# Patient Record
Sex: FEMALE | Race: OTHER | Employment: UNEMPLOYED | URBAN - METROPOLITAN AREA
[De-identification: names, ages, dates, MRNs, and addresses within clinical notes are randomized per-mention and may not be internally consistent; named-entity substitution may affect disease eponyms.]

---

## 2017-02-14 ENCOUNTER — HOSPITAL ENCOUNTER (EMERGENCY)
Age: 57
Discharge: HOME | End: 2017-02-15
Attending: EMERGENCY MEDICINE
Payer: SUBSIDIZED

## 2017-02-14 DIAGNOSIS — J06.9 ACUTE UPPER RESPIRATORY INFECTION: ICD-10-CM

## 2017-02-14 DIAGNOSIS — R07.89 CHEST WALL PAIN: Primary | ICD-10-CM

## 2017-02-14 PROCEDURE — 99283 EMERGENCY DEPT VISIT LOW MDM: CPT | Performed by: EMERGENCY MEDICINE

## 2017-02-14 PROCEDURE — 87081 CULTURE SCREEN ONLY: CPT | Performed by: EMERGENCY MEDICINE

## 2017-02-14 PROCEDURE — 87880 STREP A ASSAY W/OPTIC: CPT | Performed by: EMERGENCY MEDICINE

## 2017-02-14 PROCEDURE — 87804 INFLUENZA ASSAY W/OPTIC: CPT | Performed by: EMERGENCY MEDICINE

## 2017-02-15 ENCOUNTER — APPOINTMENT (OUTPATIENT)
Dept: GENERAL RADIOLOGY | Age: 57
End: 2017-02-15
Attending: EMERGENCY MEDICINE
Payer: SUBSIDIZED

## 2017-02-15 VITALS
SYSTOLIC BLOOD PRESSURE: 150 MMHG | HEIGHT: 57 IN | RESPIRATION RATE: 18 BRPM | TEMPERATURE: 98.7 F | WEIGHT: 130 LBS | BODY MASS INDEX: 28.05 KG/M2 | DIASTOLIC BLOOD PRESSURE: 80 MMHG | OXYGEN SATURATION: 97 % | HEART RATE: 74 BPM

## 2017-02-15 LAB
DEPRECATED S PYO AG THROAT QL EIA: NEGATIVE
FLUAV AG NPH QL IA: NEGATIVE
FLUBV AG NPH QL IA: NEGATIVE

## 2017-02-15 PROCEDURE — 71020 XR CHEST PA LAT: CPT

## 2017-02-15 NOTE — ED NOTES
Pt discharged instructions given thru . Pt v/u of  Instructions. Pt ambulatory on discharge in no acute distress at discharge.

## 2017-02-15 NOTE — DISCHARGE INSTRUCTIONS
Infección de las vías respiratorias altas ([place]): Instrucciones de cuidado - [ Upper Respiratory Infection (Cold): Care Instructions ]  Instrucciones de cuidado    La infección de las vías respiratorias altas (o URI, por jonathan siglas en inglés), es sebastián infección de la [place], los senos paranasales o la garganta. Las URI se transmiten por la tos, los estornudos y el contacto directo. El resfriado común es el tipo más frecuente de URI. La gripe y las infecciones de los senos paranasales son otros tipos de URI. Dia todas las URI son causadas por virus. Los antibióticos no las Marthenia Croon. Sin embargo, usted puede tratar la mayoría de estas infecciones con cuidados en el hogar. Arjay puede implicar beber muchos líquidos y kumar analgésicos (medicamentos para el dolor) de venta julio. Es probable que se sienta mejor al cabo de 4 a 10 días. El médico lo gore revisado minuciosamente, lilly se pueden presentar problemas más tarde. Si nota algún problema o nuevos síntomas, busque tratamiento médico inmediatamente. La atención de seguimiento es sebastián parte clave de champion tratamiento y seguridad. Asegúrese de hacer y acudir a todas las citas, y llame a champion médico si está teniendo problemas. También es sebastián buena idea saber los resultados de jonathan exámenes y mantener sebastián lista de los medicamentos que wm. ¿Cómo puede cuidarse en el hogar? · Para prevenir la deshidratación, irma abundantes líquidos, los suficientes renate para que champion orina sea de color amarillo little o transparente renate el agua. Opte por beber agua y otros líquidos mary sin cafeína hasta que se sienta mejor. Si tiene [facility], del corazón o del hígado y tiene que [place]'s líquidos, hable con champion médico antes de aumentar champion consumo. · Sula un analgésico de venta julio, renate acetaminofén (Tylenol), ibuprofeno (Advil, Motrin) o naproxeno (Aleve). Sera y siga todas las instrucciones de la [place].   · Antes de usar medicamentos para la tos y los resfriados, revise la etiqueta. Estos medicamentos podrían no ser seguros para los niños pequeños o las personas con ciertos problemas de [place]. · Tenga cuidado cuando tome medicamentos de venta julio para el resfriado común o la gripe y Tylenol al [employer]. Muchos de estos medicamentos contienen acetaminofén, o sea, Tylenol. Sera las etiquetas para asegurarse de que no está tomando sebastián dosis mayor que la recomendada. El exceso de acetaminofén (Tylenol) puede ser dañino. · Descanse lo suficiente. · No fume ni permita que otros fumen cerca de usted. Si necesita ayuda para dejar de fumar, hable con champion médico acerca de programas y medicamentos para dejar de fumar. Estos pueden aumentar jonathan probabilidades de dejar el hábito para siempre. ¿Cuándo debe pedir ayuda? Llame al 911 en cualquier momento que considere que necesita atención de [place]. Por ejemplo, llame si:  · Tiene graves dificultades para respirar. Llame a champion médico ahora mismo o busque atención médica inmediata si:  · Le parece que está mucho más enfermo. · Tiene nueva o peor dificultad para respirar. · Tiene fiebre nueva o más conrad. · Tiene un salpullido nuevo. Preste especial atención a los cambios en champion naresh y asegúrese de comunicarse con champion médico si:  · Tiene síntomas nuevos, renate dolor de garganta, dolor de oídos o dolor de los senos paranasales. · Champion tos es más profunda o más frecuente que antes, especialmente si nota más mucosidad o un cambio en el color de la mucosidad. · No mejora renate se esperaba. ¿Dónde puede encontrar más información en inglés? Yue Presto a http://elías-sara.info/. Staralex Ramin T806 en la búsqueda para aprender más acerca de \"Infección de las vías respiratorias altas ([place]): Instrucciones de cuidado - [ Upper Respiratory Infection (Cold): Care Instructions ]. \"  Revisado: 24 krause, 2016  Versión del contenido: 11.1  © 0172-5650 [employer].  Las instrucciones de cuidado fueron adaptadas bajo licencia por [employer] (which disclaims liability or warranty for this information). Si usted tiene [place] afección médica o sobre estas instrucciones, siempre pregunte a champion profesional de naresh. [facility]orporated niega toda garantía o responsabilidad por champion uso de esta información. Dolor de pecho musculoesquelético: Instrucciones de cuidado - [ Musculoskeletal Chest Pain: Care Instructions ]  Instrucciones de cuidado  El dolor de pecho no siempre es sebastián señal de que haya algo rosa maria con champion corazón, o de que tenga algún otro problema grave de naresh. Champion médico piensa que champion dolor de pecho es causado por músculos o ligamentos forzados, inflamación del cartílago del pecho, o algún otro problema en el pecho y no en el corazón. Es posible que necesite más pruebas para determinar la causa del dolor de [place]. La atención de seguimiento es sebastián parte clave de champion tratamiento y seguridad. Asegúrese de hacer y acudir a todas las citas, y llame a champion médico si está teniendo problemas. También es sebastián buena idea saber los resultados de los exámenes y mantener sebastián lista de los medicamentos que wm. ¿Cómo puede cuidarse en el hogar? · [employer] analgésicos (medicamentos para el dolor) exactamente renate le fueron indicados. ¨ Si el médico le recetó un analgésico, tómelo según las indicaciones. ¨ Si no está tomando un analgésico recetado, pregúntele a champion médico si puede kumar imelda de [employer]. · Descanse y proteja la jose angel adolorida. · Interrumpa, modifique o suspenda cualquier actividad que pudiera estar causándole el dolor. · Colóquese hielo o sebastián compresa fría en la jose angel adolorida axel 10 a 20 minutos cada vez. Trate de hacerlo cada 1 a 2 horas axel los siguientes 3 días (cuando esté despierto) o hasta que la hinchazón baje. Póngase un paño gibson entre el hielo y la piel.   · Después de 2 ó 3 días, aplíquese sebastián toalla tibia o sebastián almohadilla térmica a baja temperatura en la jose angel adolorida. Algunos médicos sugieren que se alterne entre tratamientos con calor y frío. · No se envuelva ni se vende con cinta las costillas para sostenerlas. [place] podría hacer que usted harman respiraciones más cortas, lo que podría aumentar champion riesgo de [employer]. · Las cremas [place], renate [place] o [place], podrían aliviar los músculos adoloridos. Siga las instrucciones del envase. · Siga las instrucciones de champion médico sobre el ejercicio. · El estiramiento y el masaje suaves podrían ayudarle a mejorarse más rápidamente. Estírese despacio hasta el punto antes de que comience el dolor y mantenga el estiramiento axel al menos 15 a 30 segundos. Harman esto 3 ó 4 veces al día. Harman estiramientos después de haberse aplicado calor. · A medida que champion dolor mejore, vuelva poco a poco a jonathan actividades normales. Si el dolor [place], podría ser sebastián señal de que necesita descansar axel [place]. ¿Cuándo debe pedir ayuda? Llame al 911 en cualquier momento que considere que necesita atención de emergencia. Por ejemplo, llame si:  · Siente dolor u opresión en el pecho. Estos síntomas podrían estar acompañados de:  ¨ Sudoración. ¨ Falta de aire. ¨ Náuseas o vómito. ¨ Dolor que se extiende del pecho al francois, la [place], o hacia imelda o ambos hombros o [id number]. ¨ Mareo o aturdimiento. ¨ Pulso rápido o irregular. Después de llamar al 911, mastique 1 aspirina para adultos. Espere sebastián ambulancia. No trate de conducir usted mismo un automóvil. · Tiene dolor repentino en el pecho y falta de [place], o tose willa. Llame a champion médico ahora mismo o busque atención médica inmediata si:  · Tiene cualquier dificultad para respirar. · El dolor en el pecho empeora. · Champion dolor de pecho aparece constantemente con el ejercicio y se shiv con el reposo.   Preste especial atención a los cambios en champion naresh y asegúrese de comunicarse con champion médico si:  · Champion dolor de pecho no mejora después de 1 semana. ¿Dónde puede encontrar más información en inglés? Karley Roca a http://elías-sara.info/. Diego Goon N599 en la búsqueda para aprender más acerca de \"Dolor de pecho musculoesquelético: Instrucciones de cuidado - [ Musculoskeletal Chest Pain: Care Instructions ]. \"  Revisado: 27 krause, 2016  Versión del contenido: 11.1  © 7013-2112 Healthwise, Incorporated. Las instrucciones de cuidado fueron adaptadas bajo licencia por [employer] (which disclaims liability or warranty for this information). Si usted tiene [place] afección médica o sobre estas instrucciones, siempre pregunte a champion profesional de naresh. [employer]wise, Incorporated niega toda garantía o responsabilidad por champion uso de esta información. Dolor de pecho: Instrucciones de cuidado - [ Chest Pain: Care Instructions ]  Instrucciones de cuidado  El dolor de pecho puede tener muchas causas. Algunas no son graves y mejorarán por sí solas en pocos días. Semaj algunos tipos de dolor de pecho requieren más pruebas y [place]. Es posible que champion médico le haya recomendado sebastián visita de seguimiento dentro de las 8 a 12 horas siguientes. Si no mejora, es posible que necesite [address] pruebas o [place]. Aunque champion médico le haya dado de conrad, es necesario que esté atento a cualquier problema que se presente. El médico le hizo un cuidadoso chequeo, semaj a veces los problemas pueden aparecer posteriormente. Si tiene nuevos síntomas o éstos no mejoran, obtenga atención médica de inmediato. Si tiene dolor o presión en el pecho que empeora o es diferente y que dura más de 5 minutos, o se desmayó (perdió el conocimiento), llame al 911 o busque otra ayuda de emergencia de inmediato. Acudir a sebastián consulta médica es sólo un paso en champion tratamiento.  Aunque se sienta mejor, todavía deberá hacer lo que champion médico le recomiende, renate asistir a todas las visitas de seguimiento sugeridas y kumar los medicamentos exactamente renate le fueron indicados. [place] le ayudará a recuperarse y prevenir problemas futuros. ¿Cómo puede cuidarse en el hogar? · Descanse hasta que se sienta mejor. · [place] jonathan medicamentos exactamente renate le fueron recetados. Llame a champion médico si mirella estar teniendo problemas con champion medicamento. · No conduzca después de kumar un analgésico (medicamento para el dolor) recetado. ¿Cuándo debe pedir ayuda? Llame al 911 si:  · Se desmayó (perdió el conocimiento). · Tiene graves dificultades para respirar. · Tiene síntomas de un ataque al corazón. Estos podrían incluir:  ¨ Dolor o presión en el pecho, o sebastián sensación extraña en el pecho. ¨ Sudoración. ¨ Falta de aire. ¨ Náuseas o vómito. ¨ Dolor, presión o sebastián sensación extraña en la espalda, el francois, la mandíbula, la parte superior del abdomen o en imelda o ambos hombros o brazos. ¨ Aturdimiento o debilidad repentina. ¨ Latidos del corazón rápidos o irregulares. Después de llamar al 911, es posible que el operador le diga que mastique 1 aspirina para adultos o de 2 a 4 aspirinas de dosis baja. Espere a sebastián ambulancia. No intente conducir usted mismo. Llame hoy a champion médico si:  · Tiene cualquier dificultad para respirar. · El dolor en el pecho empeora. · Siente mareos o aturdimiento, o que está a punto de [place]. · No mejora renate se esperaba. · Tiene dolor de pecho nuevo o diferente. ¿Dónde puede encontrar más información en inglés? Alyssa Tapia a http://kofi.info/. Escriba A120 en la búsqueda para aprender más acerca de \"Dolor de pecho: Instrucciones de cuidado - [ Chest Pain: Care Instructions ]. \"  Revisado: 27 krause, 2016  Versión del contenido: 11.1  © 9608-4038 [employer]. Las instrucciones de cuidado fueron adaptadas bajo licencia por [employer] (which disclaims liability or warranty for this information).  Si usted tiene [place] afección médica o sobre estas instrucciones, siempre pregunte a champion profesional de naresh. [facility]orporated niega toda garantía o responsabilidad por champion uso de esta información.

## 2017-02-15 NOTE — ED PROVIDER NOTES
HPI Comments: History is provided with assistance of a . Patient has not been sick for probably at least 4 or 5 days with some slight sore throat and body aches and some soreness to mainly her left chest that is worse with cough. Cough is throughout this time is been nonproductive. She has no GI or  symptoms. No anginal type symptoms. Patient is a 64 y.o. female presenting with sore throat. The history is provided by the patient. A  was used. Sore Throat    This is a new problem. There has been no fever. Pertinent negatives include no diarrhea, no vomiting, no shortness of breath and no stiff neck. She has tried nothing for the symptoms. Past Medical History:   Diagnosis Date    Other ill-defined conditions(139.12)      broken rib, hernia       Past Surgical History:   Procedure Laterality Date    Hx orthopaedic       right hand          History reviewed. No pertinent family history. Social History     Social History    Marital status: SINGLE     Spouse name: N/A    Number of children: N/A    Years of education: N/A     Occupational History    Not on file. Social History Main Topics    Smoking status: Never Smoker    Smokeless tobacco: Not on file    Alcohol use No    Drug use: No    Sexual activity: Not on file     Other Topics Concern    Not on file     Social History Narrative    ** Merged History Encounter **              ALLERGIES: Review of patient's allergies indicates no known allergies. Review of Systems   Constitutional: Negative for chills and fever. HENT: Positive for sore throat. Respiratory: Negative. Negative for shortness of breath. Gastrointestinal: Negative for diarrhea and vomiting. Genitourinary: Negative. All other systems reviewed and are negative.       Vitals:    02/14/17 2330   BP: 153/83   Pulse: 76   Resp: 18   Temp: 98.7 °F (37.1 °C)   SpO2: 97%   Weight: 59 kg (130 lb)   Height: 4' 9\" (1.448 m) Physical Exam   Constitutional: She appears well-developed and well-nourished. No distress. HENT:   Head: Atraumatic. Mouth/Throat: No oropharyngeal exudate. Slight whitish exudate   Eyes: Conjunctivae are normal. No scleral icterus. Neck: Neck supple. Cardiovascular: Normal rate and intact distal pulses. Pulmonary/Chest: Effort normal. No respiratory distress. She has no wheezes. She exhibits tenderness. Abdominal: Soft. There is no tenderness. There is no rebound. Neurological: She is alert. Skin: Skin is warm and dry. No rash noted. No erythema. Psychiatric: Her behavior is normal. Thought content normal.   Nursing note and vitals reviewed. MDM  Number of Diagnoses or Management Options  Acute upper respiratory infection:   Chest wall pain:   Diagnosis management comments: Viral-like illness. Will treat sx. No evidence of strep or abscess. Not toxic or septic       Amount and/or Complexity of Data Reviewed  Clinical lab tests: reviewed    Risk of Complications, Morbidity, and/or Mortality  Presenting problems: moderate  Diagnostic procedures: low  Management options: low    Patient Progress  Patient progress: stable    ED Course       Procedures    Recent Results (from the past 12 hour(s))   STREP AG SCREEN, GROUP A    Collection Time: 02/14/17 11:33 PM   Result Value Ref Range    Group A Strep Ag ID NEGATIVE  NEG     INFLUENZA A & B AG (RAPID TEST)    Collection Time: 02/14/17 11:36 PM   Result Value Ref Range    Influenza A Ag NEGATIVE  NEG      Influenza B Ag NEGATIVE  NEG        EXAM: XR CHEST PA LAT     INDICATION: cough and left chest pain with cough     COMPARISON: None.     FINDINGS: PA and lateral radiographs of the chest demonstrate clear lungs.  The  cardiac and mediastinal contours and pulmonary vascularity are normal. The  bones and soft tissues are within normal limits.      IMPRESSION  IMPRESSION: Normal chest.

## 2017-02-17 LAB
BACTERIA SPEC CULT: NORMAL
SERVICE CMNT-IMP: NORMAL

## 2017-04-05 ENCOUNTER — HOSPITAL ENCOUNTER (OUTPATIENT)
Dept: MAMMOGRAPHY | Age: 57
Discharge: HOME | End: 2017-04-05
Attending: OBSTETRICS & GYNECOLOGY
Payer: COMMERCIAL

## 2017-04-05 DIAGNOSIS — Z12.31 VISIT FOR SCREENING MAMMOGRAM: ICD-10-CM

## 2017-04-05 PROCEDURE — 77067 SCR MAMMO BI INCL CAD: CPT

## 2018-03-12 ENCOUNTER — APPOINTMENT (OUTPATIENT)
Dept: GENERAL RADIOLOGY | Age: 58
End: 2018-03-12
Attending: EMERGENCY MEDICINE
Payer: SELF-PAY

## 2018-03-12 ENCOUNTER — HOSPITAL ENCOUNTER (EMERGENCY)
Age: 58
Discharge: HOME | End: 2018-03-12
Attending: EMERGENCY MEDICINE
Payer: SELF-PAY

## 2018-03-12 VITALS
SYSTOLIC BLOOD PRESSURE: 132 MMHG | TEMPERATURE: 98.7 F | BODY MASS INDEX: 26.5 KG/M2 | WEIGHT: 135 LBS | RESPIRATION RATE: 20 BRPM | HEART RATE: 84 BPM | HEIGHT: 60 IN | DIASTOLIC BLOOD PRESSURE: 87 MMHG | OXYGEN SATURATION: 98 %

## 2018-03-12 DIAGNOSIS — R05.9 COUGH: Primary | ICD-10-CM

## 2018-03-12 LAB
ALBUMIN SERPL-MCNC: 3.6 G/DL (ref 3.5–5)
ALBUMIN/GLOB SERPL: 0.9 {RATIO} (ref 1.2–3.5)
ALP SERPL-CCNC: 112 U/L (ref 50–136)
ALT SERPL-CCNC: 59 U/L (ref 12–65)
ANION GAP SERPL CALC-SCNC: 11 MMOL/L (ref 7–16)
AST SERPL-CCNC: 32 U/L (ref 15–37)
ATRIAL RATE: 61 BPM
BACTERIA URNS QL MICRO: 0 /HPF
BASOPHILS # BLD: 0 K/UL (ref 0–0.2)
BASOPHILS NFR BLD: 1 % (ref 0–2)
BILIRUB SERPL-MCNC: 0.3 MG/DL (ref 0.2–1.1)
BNP SERPL-MCNC: 4 PG/ML
BUN SERPL-MCNC: 16 MG/DL (ref 6–23)
CALCIUM SERPL-MCNC: 8.6 MG/DL (ref 8.3–10.4)
CALCULATED P AXIS, ECG09: 25 DEGREES
CALCULATED R AXIS, ECG10: 82 DEGREES
CALCULATED T AXIS, ECG11: 34 DEGREES
CASTS URNS QL MICRO: 0 /LPF
CHLORIDE SERPL-SCNC: 108 MMOL/L (ref 98–107)
CO2 SERPL-SCNC: 25 MMOL/L (ref 21–32)
CREAT SERPL-MCNC: 0.58 MG/DL (ref 0.6–1)
DEPRECATED S PYO AG THROAT QL EIA: NEGATIVE
DIAGNOSIS, 93000: NORMAL
DIFFERENTIAL METHOD BLD: NORMAL
EOSINOPHIL # BLD: 0.1 K/UL (ref 0–0.8)
EOSINOPHIL NFR BLD: 1 % (ref 0.5–7.8)
EPI CELLS #/AREA URNS HPF: NORMAL /HPF
ERYTHROCYTE [DISTWIDTH] IN BLOOD BY AUTOMATED COUNT: 13.2 % (ref 11.9–14.6)
FLUAV AG NPH QL IA: NEGATIVE
FLUBV AG NPH QL IA: NEGATIVE
GLOBULIN SER CALC-MCNC: 4 G/DL (ref 2.3–3.5)
GLUCOSE SERPL-MCNC: 108 MG/DL (ref 65–100)
HCT VFR BLD AUTO: 41.1 % (ref 35.8–46.3)
HGB BLD-MCNC: 14.3 G/DL (ref 11.7–15.4)
IMM GRANULOCYTES # BLD: 0 K/UL (ref 0–0.5)
IMM GRANULOCYTES NFR BLD AUTO: 0 % (ref 0–5)
LYMPHOCYTES # BLD: 1.7 K/UL (ref 0.5–4.6)
LYMPHOCYTES NFR BLD: 40 % (ref 13–44)
MCH RBC QN AUTO: 31.3 PG (ref 26.1–32.9)
MCHC RBC AUTO-ENTMCNC: 34.8 G/DL (ref 31.4–35)
MCV RBC AUTO: 89.9 FL (ref 79.6–97.8)
MONOCYTES # BLD: 0.4 K/UL (ref 0.1–1.3)
MONOCYTES NFR BLD: 10 % (ref 4–12)
NEUTS SEG # BLD: 2.1 K/UL (ref 1.7–8.2)
NEUTS SEG NFR BLD: 48 % (ref 43–78)
P-R INTERVAL, ECG05: 154 MS
PLATELET # BLD AUTO: 213 K/UL (ref 150–450)
PMV BLD AUTO: 11.3 FL (ref 10.8–14.1)
POTASSIUM SERPL-SCNC: 3.8 MMOL/L (ref 3.5–5.1)
PROT SERPL-MCNC: 7.6 G/DL (ref 6.3–8.2)
Q-T INTERVAL, ECG07: 462 MS
QRS DURATION, ECG06: 82 MS
QTC CALCULATION (BEZET), ECG08: 465 MS
RBC # BLD AUTO: 4.57 M/UL (ref 4.05–5.25)
RBC #/AREA URNS HPF: 0 /HPF
SODIUM SERPL-SCNC: 144 MMOL/L (ref 136–145)
TROPONIN I BLD-MCNC: 0 NG/ML (ref 0.02–0.05)
VENTRICULAR RATE, ECG03: 61 BPM
WBC # BLD AUTO: 4.4 K/UL (ref 4.3–11.1)
WBC URNS QL MICRO: NORMAL /HPF

## 2018-03-12 PROCEDURE — 87804 INFLUENZA ASSAY W/OPTIC: CPT | Performed by: EMERGENCY MEDICINE

## 2018-03-12 PROCEDURE — 84484 ASSAY OF TROPONIN QUANT: CPT

## 2018-03-12 PROCEDURE — 85025 COMPLETE CBC W/AUTO DIFF WBC: CPT | Performed by: EMERGENCY MEDICINE

## 2018-03-12 PROCEDURE — 81015 MICROSCOPIC EXAM OF URINE: CPT | Performed by: EMERGENCY MEDICINE

## 2018-03-12 PROCEDURE — 83880 ASSAY OF NATRIURETIC PEPTIDE: CPT | Performed by: EMERGENCY MEDICINE

## 2018-03-12 PROCEDURE — 96374 THER/PROPH/DIAG INJ IV PUSH: CPT | Performed by: EMERGENCY MEDICINE

## 2018-03-12 PROCEDURE — 94640 AIRWAY INHALATION TREATMENT: CPT

## 2018-03-12 PROCEDURE — 74011250636 HC RX REV CODE- 250/636: Performed by: EMERGENCY MEDICINE

## 2018-03-12 PROCEDURE — 96361 HYDRATE IV INFUSION ADD-ON: CPT | Performed by: EMERGENCY MEDICINE

## 2018-03-12 PROCEDURE — 77030013140 HC MSK NEB VYRM -A

## 2018-03-12 PROCEDURE — 87081 CULTURE SCREEN ONLY: CPT | Performed by: EMERGENCY MEDICINE

## 2018-03-12 PROCEDURE — 87880 STREP A ASSAY W/OPTIC: CPT | Performed by: EMERGENCY MEDICINE

## 2018-03-12 PROCEDURE — 74011000250 HC RX REV CODE- 250: Performed by: EMERGENCY MEDICINE

## 2018-03-12 PROCEDURE — 93005 ELECTROCARDIOGRAM TRACING: CPT | Performed by: EMERGENCY MEDICINE

## 2018-03-12 PROCEDURE — 71046 X-RAY EXAM CHEST 2 VIEWS: CPT

## 2018-03-12 PROCEDURE — 80053 COMPREHEN METABOLIC PANEL: CPT | Performed by: EMERGENCY MEDICINE

## 2018-03-12 PROCEDURE — 99284 EMERGENCY DEPT VISIT MOD MDM: CPT | Performed by: EMERGENCY MEDICINE

## 2018-03-12 RX ORDER — DOXYCYCLINE HYCLATE 100 MG
100 TABLET ORAL 2 TIMES DAILY
Qty: 20 TAB | Refills: 0 | Status: SHIPPED | OUTPATIENT
Start: 2018-03-12 | End: 2018-03-22

## 2018-03-12 RX ORDER — SODIUM CHLORIDE 0.9 % (FLUSH) 0.9 %
5-10 SYRINGE (ML) INJECTION EVERY 8 HOURS
Status: DISCONTINUED | OUTPATIENT
Start: 2018-03-12 | End: 2018-03-12 | Stop reason: HOSPADM

## 2018-03-12 RX ORDER — SODIUM CHLORIDE 0.9 % (FLUSH) 0.9 %
5-10 SYRINGE (ML) INJECTION AS NEEDED
Status: DISCONTINUED | OUTPATIENT
Start: 2018-03-12 | End: 2018-03-12 | Stop reason: HOSPADM

## 2018-03-12 RX ORDER — ALBUTEROL SULFATE 0.83 MG/ML
5 SOLUTION RESPIRATORY (INHALATION)
Status: COMPLETED | OUTPATIENT
Start: 2018-03-12 | End: 2018-03-12

## 2018-03-12 RX ADMIN — SODIUM CHLORIDE 1000 ML: 900 INJECTION, SOLUTION INTRAVENOUS at 13:21

## 2018-03-12 RX ADMIN — METHYLPREDNISOLONE SODIUM SUCCINATE 125 MG: 125 INJECTION, POWDER, FOR SOLUTION INTRAMUSCULAR; INTRAVENOUS at 16:22

## 2018-03-12 RX ADMIN — Medication 10 ML: at 15:33

## 2018-03-12 RX ADMIN — ALBUTEROL SULFATE 5 MG: 2.5 SOLUTION RESPIRATORY (INHALATION) at 16:57

## 2018-03-12 NOTE — ED NOTES
I have reviewed discharge instructions with the patient. The patient verbalized understanding. Patient to follow up with PMD and RTED with any changes/concerns. Patient expresses understanding. Patient ambulatory from ED in NAD with Rx x 1.  line utilized.

## 2018-03-12 NOTE — ED NOTES
Patient up for Discharge. Awaiting  back in department for assistance. If  takes any longer than 15 minutes, will utilize language line.

## 2018-03-12 NOTE — ED PROVIDER NOTES
HPI Comments: 1:18 PM Patient was seen in triage, a brief history and physical was done. Labs and/or other studies were ordered pending placement of patient in the back. vvia , patient states she has had several days of a dry cough and \"my throat feeling dry\". She denies any orthopnea, no vomiting or diarrhea or fever. Aretha iFore MD    Patient is a 63 yo female with no significant PMH (Irish speaking,  used)      Note as above, care assumed by myself, will add that she has had productive cough which she describes as \"brown and green\" and states she feels flem/drainage in her throat in the morning and late at night and it feels dry through-out the day. Denies fevers or chills, no abdominal pain. States chest pain with coughing, none currently. The history is provided by the patient. No  was used. Past Medical History:   Diagnosis Date    Other ill-defined conditions     broken rib, hernia       Past Surgical History:   Procedure Laterality Date    HX ORTHOPAEDIC      right hand          No family history on file. Social History     Social History    Marital status: SINGLE     Spouse name: N/A    Number of children: N/A    Years of education: N/A     Occupational History    Not on file. Social History Main Topics    Smoking status: Never Smoker    Smokeless tobacco: Not on file    Alcohol use No    Drug use: No    Sexual activity: Not on file     Other Topics Concern    Not on file     Social History Narrative    ** Merged History Encounter **         ** Merged History Encounter **              ALLERGIES: Review of patient's allergies indicates no known allergies. Review of Systems   Constitutional: Negative for chills and fever. HENT: Positive for congestion, postnasal drip and sore throat. Eyes: Negative for visual disturbance. Respiratory: Positive for cough. Negative for shortness of breath.     Cardiovascular: Negative for chest pain and leg swelling. Gastrointestinal: Negative for abdominal pain, diarrhea, nausea and vomiting. Genitourinary: Negative for dysuria. Musculoskeletal: Negative for back pain and neck pain. Skin: Negative for rash. Neurological: Negative for weakness and headaches. Psychiatric/Behavioral: The patient is not nervous/anxious. There were no vitals filed for this visit. Physical Exam   Constitutional: She is oriented to person, place, and time. She appears well-developed and well-nourished. HENT:   Head: Normocephalic. Right Ear: External ear normal.   Left Ear: External ear normal.   Eyes: Conjunctivae and EOM are normal. Pupils are equal, round, and reactive to light. Neck: Normal range of motion. Neck supple. No tracheal deviation present. Cardiovascular: Normal rate, regular rhythm, normal heart sounds and intact distal pulses. No murmur heard. Pulmonary/Chest: Effort normal and breath sounds normal. No respiratory distress. Abdominal: Soft. There is no tenderness. Musculoskeletal: Normal range of motion. Neurological: She is alert and oriented to person, place, and time. No cranial nerve deficit. Skin: No rash noted. Nursing note and vitals reviewed.        MDM  Number of Diagnoses or Management Options  Cough: new and requires workup     Amount and/or Complexity of Data Reviewed  Clinical lab tests: ordered and reviewed  Tests in the radiology section of CPT®: ordered and reviewed  Tests in the medicine section of CPT®: ordered and reviewed  Review and summarize past medical records: yes    Risk of Complications, Morbidity, and/or Mortality  Presenting problems: high  Diagnostic procedures: high  Management options: high    Patient Progress  Patient progress: stable        ED Course       Procedures    Recent Results (from the past 12 hour(s))   INFLUENZA A & B AG (RAPID TEST)    Collection Time: 03/12/18  1:23 PM   Result Value Ref Range    Influenza A Ag NEGATIVE  NEG      Influenza B Ag NEGATIVE  NEG     STREP AG SCREEN, GROUP A    Collection Time: 03/12/18  1:23 PM   Result Value Ref Range    Group A Strep Ag ID NEGATIVE  NEG     CBC WITH AUTOMATED DIFF    Collection Time: 03/12/18  1:30 PM   Result Value Ref Range    WBC 4.4 4.3 - 11.1 K/uL    RBC 4.57 4.05 - 5.25 M/uL    HGB 14.3 11.7 - 15.4 g/dL    HCT 41.1 35.8 - 46.3 %    MCV 89.9 79.6 - 97.8 FL    MCH 31.3 26.1 - 32.9 PG    MCHC 34.8 31.4 - 35.0 g/dL    RDW 13.2 11.9 - 14.6 %    PLATELET 687 467 - 360 K/uL    MPV 11.3 10.8 - 14.1 FL    DF AUTOMATED      NEUTROPHILS 48 43 - 78 %    LYMPHOCYTES 40 13 - 44 %    MONOCYTES 10 4.0 - 12.0 %    EOSINOPHILS 1 0.5 - 7.8 %    BASOPHILS 1 0.0 - 2.0 %    IMMATURE GRANULOCYTES 0 0.0 - 5.0 %    ABS. NEUTROPHILS 2.1 1.7 - 8.2 K/UL    ABS. LYMPHOCYTES 1.7 0.5 - 4.6 K/UL    ABS. MONOCYTES 0.4 0.1 - 1.3 K/UL    ABS. EOSINOPHILS 0.1 0.0 - 0.8 K/UL    ABS. BASOPHILS 0.0 0.0 - 0.2 K/UL    ABS. IMM. GRANS. 0.0 0.0 - 0.5 K/UL   METABOLIC PANEL, COMPREHENSIVE    Collection Time: 03/12/18  1:30 PM   Result Value Ref Range    Sodium 144 136 - 145 mmol/L    Potassium 3.8 3.5 - 5.1 mmol/L    Chloride 108 (H) 98 - 107 mmol/L    CO2 25 21 - 32 mmol/L    Anion gap 11 7 - 16 mmol/L    Glucose 108 (H) 65 - 100 mg/dL    BUN 16 6 - 23 MG/DL    Creatinine 0.58 (L) 0.6 - 1.0 MG/DL    GFR est AA >60 >60 ml/min/1.73m2    GFR est non-AA >60 >60 ml/min/1.73m2    Calcium 8.6 8.3 - 10.4 MG/DL    Bilirubin, total 0.3 0.2 - 1.1 MG/DL    ALT (SGPT) 59 12 - 65 U/L    AST (SGOT) 32 15 - 37 U/L    Alk.  phosphatase 112 50 - 136 U/L    Protein, total 7.6 6.3 - 8.2 g/dL    Albumin 3.6 3.5 - 5.0 g/dL    Globulin 4.0 (H) 2.3 - 3.5 g/dL    A-G Ratio 0.9 (L) 1.2 - 3.5     BNP    Collection Time: 03/12/18  1:30 PM   Result Value Ref Range    BNP 4 pg/mL   EKG, 12 LEAD, INITIAL    Collection Time: 03/12/18  3:53 PM   Result Value Ref Range    Ventricular Rate 61 BPM    Atrial Rate 61 BPM    P-R Interval 154 ms QRS Duration 82 ms    Q-T Interval 462 ms    QTC Calculation (Bezet) 465 ms    Calculated P Axis 25 degrees    Calculated R Axis 82 degrees    Calculated T Axis 34 degrees    Diagnosis       Sinus rhythm with Fusion complexes  Otherwise normal ECG  No previous ECGs available     POC TROPONIN-I    Collection Time: 03/12/18  4:19 PM   Result Value Ref Range    Troponin-I (POC) 0 (L) 0.02 - 0.05 ng/ml   URINE MICROSCOPIC    Collection Time: 03/12/18  4:36 PM   Result Value Ref Range    WBC 0-3 0 /hpf    RBC 0 0 /hpf    Epithelial cells 0-3 0 /hpf    Bacteria 0 0 /hpf    Casts 0 0 /lpf     Xr Chest Pa Lat    Result Date: 3/12/2018  TWO-VIEW CHEST: CLINICAL HISTORY: Cough for 2 weeks. COMPARISON:  February 15, 2017. FINDINGS: PA and lateral chest images demonstrate no acute pneumonic infiltrate or significant pleural fluid collection. The heart size is within normal limits without evidence of congestive heart failure or pneumothorax. The bony thorax appears intact on these views. IMPRESSION:  NO ACUTE CARDIOPULMONARY DISEASE IDENTIFIED. 61 yo female with cough and congestion:    Given duration will treat with doxy for bronchitis vs. Subclinical pneumonia. Patient to follow up with PCP in 2-3 days for recheck or return immediately with any worsening cough, chest pain, shortness of breath or any further concerns. Of note, patient improved with rx with albuterol in ED.

## 2018-03-12 NOTE — DISCHARGE INSTRUCTIONS
Tos: Instrucciones de cuidado - [ Cough: Care Instructions ]  Instrucciones de cuidado    La tos es Vel[place] de champion cuerpo a algo que molesta en la garganta o las vías respiratorias. La tos puede ser provocada por muchas cosas. Usted podría toser debido a un resfriado o sebastián gripe, sebastián bronquitis o el asma. Fumar, el goteo posnasal, las alergias y el ácido estomacal que regresa a champion garganta también pueden causar tos. La tos es un síntoma, no sebastián enfermedad. En la [employer], la tos cesa cuando desaparece la causa, renate un resfriado. Puede kumar algunas medidas en champion hogar para toser menos y sentirse mejor. La atención de seguimiento es sebastián parte clave de champion tratamiento y seguridad. Asegúrese de hacer y acudir a todas las citas, y llame a champion médico si está teniendo problemas. También es sebastián buena idea saber los resultados de los exámenes y mantener sebastián lista de los medicamentos que wm. ¿Cómo puede cuidarse en el hogar? · Deanne abundante agua y otros líquidos. Chacra ayuda a [place] [employer] mucosidad sea menos espesa y [place] la garganta seca o adolorida. La miel o el jugo de danish en [place] o té podrían aliviar sebastián tos seca. · [employer] International medicamentos para la tos según las indicaciones de champion médico.  · Eleve la sheldon sobre almohadas para ayudarle a respirar y aliviar la tos seca. · Pruebe pastillas para la tos para aliviar la garganta seca o adolorida. Las pastillas para la tos no detienen la tos. Las pastillas para la tos medicinales saborizadas no son mejores que las pastillas con sabor a dee o los caramelos duros. · No fume. Evite el humo de tabaco ambiental. Si necesita ayuda para dejar de fumar, hable con champion médico sobre programas y medicamentos para dejar de fumar. Estos pueden aumentar jonathan probabilidades de dejar el hábito para siempre. ¿Cuándo debe pedir ayuda? Llame al 911 en cualquier momento que considere que necesita atención de [place]. Por ejemplo, llame si:  ?  · Tiene graves dificultades para respirar. ? Llame a champion médico ahora mismo o busque atención médica inmediata si:  ? · Tose willa. ? · Tiene nuevas dificultades para respirar o Prieto Ding. ? · Tiene fiebre nueva o más conrad. ? · Tiene un salpullido nuevo. ?Preste especial atención a los cambios en champion naresh y asegúrese de comunicarse con champion médico si:  ? · Champion tos es más profunda o más frecuente que antes, especialmente si nota más mucosidad o un cambio en el color de la mucosidad. ? · Tiene nuevos síntomas, renate dolor de garganta, dolor de oídos o dolor en los senos paranasales. ? · No mejora renate se esperaba. ¿Dónde puede encontrar más información en inglés? Amita Proper a [email or web address]-[email or web address].info/. Escriba D279 en la búsqueda para aprender más acerca de \"Tos: Instrucciones de cuidado - [ Cough: Care Instructions ]. \"  Revisado: 12 krause, 2017  Versión del contenido: 11.4  © 3314-6503 Healthwise, Incorporated. Las instrucciones de cuidado fueron adaptadas bajo licencia por Good Help Connections (which disclaims liability or warranty for this information). Si usted tiene [place] afección médica o sobre estas instrucciones, siempre pregunte a champion profesional de naresh. Healthwise, Incorporated niega toda garantía o responsabilidad por champion uso de esta información. Doxiciclina (Por la boca)   Se [place] para tratar y [address] infecciones. También se [place] para prevenir la malaria y tratar la rosácea o el acné grave. Pertenece a la clase de antibióticos derivados de la tetraciclina. Tyler(s) : Acticlate, Adoxa, Adoxa Abhilash 1/150, Avidoxy, Avidoxy DK, BenzoDox 30 Kit, BenzoDox 60 Kit, Doryx, Doryx MPC, Monodox, Morgidox [id number], Morgidox [id number] Kit, Morgidox 1x50MG, Morgidox 1x50MG Kit, Morgidox [id number]   Existen muchas otras [address] [facility] de [address]. Daysi medicamento no debe ser usado cuando:   Daysi medicamento no es adecuado para todas las personas.  No lo use si ha tenido sebastián reacción alérgica a la doxiciclina o a otro antibiótico derivado de la tetraciclina, si está embarazada o dando de lactar. [place] de usar daysi medicamento:   [facility] de liberación [place], [place] de liberación prolongada, [place], [place], Tableta de liberación programada  · Champion médico le indicara cuanto medicamento necesita usar. No use más medicamento de lo indicado. · Pregunte a champion médico o farmacéutico si usted debe kumar [employer] con o sin alimentos. Algunas de las formas que se presenta daysi medicamento pueden tomarse con alimentos o con [employer], lilly otras formas deben tomarse con el estómago vacío. · Cápsulas Oracea®: Daysi medicamento debe tomarse con el estómago vacío, por lo menos 1 hora antes o 2 horas después de la comida. · Cápsula: Tráguela entera. No la quiebre, triture, mastique ni chidi. · Tabletas de liberación prolongada: También puede kumar daysi medicamento Kim trozos de la tableta con puré de manzana a temperatura ambiente Ingiera la mezcla inmediatamente. No la mastique. No la guarde para usarla más adelante. Puede kumar esta medicina con comida o leche para evitar el malestar estomacal.  · Solución oral: Agite el envase lita antes de cada uso. Mida el líquido oral con Tressa Smith para uso oral o taza especialmente marcadas para medir medicamentos. · Tabletas: Usted puede kumar las tabletas con comida o con leche para evitar la irritación estomacal. Para partir sebastián tableta, tómela con el pulgar y [facility] cerca de la [place]. Luego, aplique suficiente presión renate para quebrar la [employer]. No use la tableta si no se quiebra en la [place]. · [place] todo champion medicamento recetado para eliminar champion infección por completo aunque usted se sienta mejor después de las primeras dosis. · Si usted wm la cápsula o tableta, ingiera suficiente líquido para evitar problemas de garganta.   · Prevención de la malaria: Comience a kumar daysi medicamento 1 o 2 lebron antes de salir de viaje. [employer] todos los días axel el viaje. Continúe tomándolo axel 4 semanas después de regresar. No use, sin embargo, ivan medicamento por más de 4 meses. · No tome ivan medicamento por más de 9 meses si lo está usando para tratar la rosácea. · Use sólo la linda del medicamento que el médico le ha prescrito. Es posible que otras marcas no tengan el mismo [place]. · Sera y siga las instrucciones para el paciente que vienen con el medicamento. Hable con champion médico o farmacéutico si tiene alguna pregunta. · Si olvida sebastián dosis: Si olvida sebastián dosis de champion medicamento, tómelo lo más pronto posible. Si es sánchez la hora para champion próxima dosis, espere hasta entonces para kumar champion dosis regular. No use medicamento adicional para reponer la dosis olvidada. · Guarde el medicamento en un recipiente cerrado a temperatura ambiente y alejado del calor, la humedad y la juanis directa. No congele la solución oral.  Medicamentos y alimentos que debe evitar:   Consulte con champion médico o farmacéutico antes de usar cualquier otro medicamento, incluidos los de [employer], las vitaminas y los productos herbales. · Algunos medicamento pueden afectar la eficacia de la doxiciclina.  Informe a champion médico si está usando cualquiera de los siguientes:  ¨ Subsalicilato de bismuto  ¨ Medicamentos para el acné (incluyendo isotretinoína)  ¨ Las pastillas anticonceptivas  ¨ Anticoagulantes (incluyendo warfarina)  ¨ Medicamentos para las convulsiones (incluyendo [place], [place], fenitoína)  ¨ Medicamentos que contengan aluminio, calcio o usman (incluyendo un antiácido o suplementos vitamínicos)  ¨ Medicamentos para tratar la psoriasis (incluyendo acitretina)  ¨ Antibióticos de penicilina  ¨ Medicamentos para el estómago  Precauciones axel el uso de ivan medicamento:   · Ivan medicamento puede causar malformaciones congénitas en el bebé si axel la inna o el embarazo alguno de [place] usando [place]. Infórmele a champion médico de inmediato si champion viviane o usted [place] Es posible que las píldoras anticonceptivas no tengan la misma eficacia cuando se utilizan junto con [address]. Use un mavis método anticonceptivo para evitar el embarazo. · Informe a champion médico si tiene sebastián enfermedad renal o hepática, asma o alergia a los sulfitos. Informe a champion médico si ha tenido [place] o si tiene antecedentes de infecciones por hongos. · Ivan medicamento puede causar los siguientes problemas:  ¨ Cambio permanente del color de los dientes (en niños menores de 8 años)  ¨ Aumento de presión dentro de la sheldon  ¨ Infección por hongos  ¨ Problemas del sistema inmunitario  · Ivan medicamento puede causar diarrea. Llame a champion médico si la diarrea se intensifica, no se detiene, o contiene willa. No tome ningún medicamento para suspender la diarrea hasta que hable con champion médico. La diarrea puede ocurrir 2 meces o más después de suspender el uso de [address]. · Ivan medicamento puede hacer que champion piel se vuelva más sensible a la juanis solar. Use protector solar. No use lámparas ni cámaras bronceadoras. · Dígale a todo médico o dentista encargado de atenderle que usted está usando [address]. Puede que ivan medicamento afecte algunos resultados de "[employer]. · Llame a champion médico si jonathan síntomas no mejoran, o si [facility]. · El médico solicitará exámenes de laboratorio axel las citas de rutina para revisar los efectos de [address]. Asista a todas jonathan citas. · Guarde todos los medicamentos fuera del alcance de los niños. Nunca comparta jonathan medicamentos con [facility].   Efectos secundarios que pueden presentarse axel el uso de ivan medicamento:   Consulte inmediatamente con el médico si nota cualquiera de estos efectos secundarios:  · Reacción alérgica: Comezón o ronchas, hinchazón del alea o las lanny, hinchazón u hormigueo en la boca o garganta, opresión en el pecho, dificultad para respirar  · Ampollas, despellejamiento, sarpullido cortez en la piel. · Ardor, dolor o irritación en la parte superior del estómago o la garganta  · Diarrea con posible presencia de willa  · [place], tos, flujo o congestión nasal, dolor de garganta, emmie de cuerpo  · [employer], fiebre, salpullido y cansancio o debilidad inusuales  · Dolor de sheldon intenso, mareos o cambios en la vista  · Dolor de estómago súbito e intenso, náuseas, vómito, desvanecimientos  Consulte con el médico si nota los siguientes efectos secundarios menos graves:   · Oscurecimiento de la piel, las cicatrices, los dientes o las encías  · Ampollas o manchas sabrina en la boca, labios o garganta  Consulte con el médico si nota otros efectos secundarios que mirella son causados por ivan medicamento. Llame a champion médico para consultarle [place]. Usted puede notificar jonathan efectos secundarios al FDA al 2-265-LKP-[phone]. © 2017 [address]  Information is for End User's use only and may not be sold, redistributed or otherwise used for commercial purposes. Esta información es sólo para uso en educación. Champion intención no es darle un consejo médico sobre enfermedades o tratamientos. Colsulte con champion Burris Stagers farmacéutico antes de seguir cualquier régimen médico para saber si es seguro y efectivo para usted.

## 2018-03-12 NOTE — PROGRESS NOTES
present to cover any requests in the Emergency Room. Thank you for this referral,      Jose Turpin, [facility]  /Patient [address].  [address] [place] [address]    [phone]

## 2018-03-14 LAB
BACTERIA SPEC CULT: NORMAL
SERVICE CMNT-IMP: NORMAL

## 2018-12-31 ENCOUNTER — HOSPITAL ENCOUNTER (EMERGENCY)
Age: 58
Discharge: HOME | End: 2018-12-31
Attending: EMERGENCY MEDICINE
Payer: SELF-PAY

## 2018-12-31 VITALS
DIASTOLIC BLOOD PRESSURE: 79 MMHG | HEART RATE: 99 BPM | TEMPERATURE: 98.6 F | SYSTOLIC BLOOD PRESSURE: 124 MMHG | RESPIRATION RATE: 17 BRPM | OXYGEN SATURATION: 96 %

## 2018-12-31 DIAGNOSIS — J06.9 UPPER RESPIRATORY TRACT INFECTION, UNSPECIFIED TYPE: Primary | ICD-10-CM

## 2018-12-31 LAB
FLUAV AG NPH QL IA: NEGATIVE
FLUBV AG NPH QL IA: NEGATIVE
SPECIMEN SOURCE: NORMAL

## 2018-12-31 PROCEDURE — 99282 EMERGENCY DEPT VISIT SF MDM: CPT | Performed by: EMERGENCY MEDICINE

## 2018-12-31 PROCEDURE — 87804 INFLUENZA ASSAY W/OPTIC: CPT

## 2018-12-31 RX ORDER — BENZONATATE 200 MG/1
200 CAPSULE ORAL
Qty: 30 CAP | Refills: 0 | Status: SHIPPED | OUTPATIENT
Start: 2018-12-31 | End: 2019-01-10

## 2018-12-31 RX ORDER — CEPHALEXIN 500 MG/1
500 CAPSULE ORAL 4 TIMES DAILY
Qty: 28 CAP | Refills: 0 | Status: SHIPPED | OUTPATIENT
Start: 2018-12-31 | End: 2019-01-07

## 2018-12-31 NOTE — ED PROVIDER NOTES
The history is provided by the patient. The history is limited by a language barrier. No  was used. Flu This is a new problem. The current episode started more than 1 week ago. The problem occurs constantly. The problem has not changed since onset. The cough is non-productive. There has been no fever. Treatments tried: otc meds. The treatment provided mild relief. She is not a smoker. Her past medical history does not include pneumonia or asthma. Past Medical History:  
Diagnosis Date  Other ill-defined conditions(891.87) broken rib, hernia Past Surgical History:  
Procedure Laterality Date  HX ORTHOPAEDIC    
 right hand History reviewed. No pertinent family history. Social History Socioeconomic History  Marital status: SINGLE Spouse name: Not on file  Number of children: Not on file  Years of education: Not on file  Highest education level: Not on file Social Needs  Financial resource strain: Not on file  Food insecurity - worry: Not on file  Food insecurity - inability: Not on file  Transportation needs - medical: Not on file  Transportation needs - non-medical: Not on file Occupational History  Not on file Tobacco Use  Smoking status: Never Smoker  Smokeless tobacco: Never Used Substance and Sexual Activity  Alcohol use: No  
 Drug use: No  
 Sexual activity: Not on file Other Topics Concern  Not on file Social History Narrative ** Merged History Encounter **  
    
 ** Merged History Encounter ** ALLERGIES: Patient has no known allergies. Review of Systems All other systems reviewed and are negative. Vitals:  
 12/31/18 1441 BP: 124/79 Pulse: 99 Resp: 17 Temp: 98.6 °F (37 °C) SpO2: 96% Physical Exam  
Constitutional: She is oriented to person, place, and time. She appears well-developed and well-nourished. No distress.   
HENT:  
 Head: Normocephalic and atraumatic. Right Ear: External ear normal.  
Left Ear: External ear normal.  
Nasal congestion, dry cough Eyes: EOM are normal. Pupils are equal, round, and reactive to light. Neck: Normal range of motion. Neck supple. Cardiovascular: Normal rate and regular rhythm. Pulmonary/Chest: Effort normal and breath sounds normal. She has no wheezes. She has no rales. She exhibits no tenderness. Abdominal: Soft. Bowel sounds are normal.  
Musculoskeletal: Normal range of motion. Neurological: She is alert and oriented to person, place, and time. Skin: Skin is warm. She is not diaphoretic. Psychiatric: She has a normal mood and affect. Nursing note and vitals reviewed. MDM Number of Diagnoses or Management Options Diagnosis management comments: Flu - Will treat uri Amount and/or Complexity of Data Reviewed Clinical lab tests: ordered and reviewed Review and summarize past medical records: yes Risk of Complications, Morbidity, and/or Mortality Presenting problems: low Diagnostic procedures: low Management options: low Patient Progress Patient progress: improved Procedures

## 2018-12-31 NOTE — ED NOTES
I have reviewed discharge instructions with the patient. The patient and son verbalized understanding. Patient left ED via Discharge Method: ambulatory to Home with (SON). Opportunity for questions and clarification provided. Patient given 2 scripts. To continue your aftercare when you leave the hospital, you may receive an automated call from our care team to check in on how you are doing. This is a free service and part of our promise to provide the best care and service to meet your aftercare needs.  If you have questions, or wish to unsubscribe from this service please call [phone]. Thank you for Choosing our [facility] Emergency Department.

## 2018-12-31 NOTE — ED TRIAGE NOTES
Pt has had a cough for the last 2 weeks along with chills and body aches. Intermittent fevers, last was 3 days ago. Did not get a flu shot.

## 2019-02-26 ENCOUNTER — HOSPITAL ENCOUNTER (EMERGENCY)
Age: 59
Discharge: HOME | End: 2019-02-26
Attending: EMERGENCY MEDICINE
Payer: SELF-PAY

## 2019-02-26 VITALS
HEART RATE: 94 BPM | TEMPERATURE: 98.8 F | OXYGEN SATURATION: 97 % | SYSTOLIC BLOOD PRESSURE: 130 MMHG | RESPIRATION RATE: 16 BRPM | DIASTOLIC BLOOD PRESSURE: 76 MMHG

## 2019-02-26 DIAGNOSIS — J02.9 ACUTE PHARYNGITIS, UNSPECIFIED ETIOLOGY: Primary | ICD-10-CM

## 2019-02-26 PROCEDURE — 99281 EMR DPT VST MAYX REQ PHY/QHP: CPT | Performed by: EMERGENCY MEDICINE

## 2019-02-26 RX ORDER — HYDROCODONE POLISTIREX AND CHLORPHENIRAMINE POLISTIREX 10; 8 MG/5ML; MG/5ML
5 SUSPENSION, EXTENDED RELEASE ORAL
Qty: 60 ML | Refills: 0 | Status: SHIPPED | OUTPATIENT
Start: 2019-02-26 | End: 2019-02-26

## 2019-02-26 RX ORDER — HYDROCODONE POLISTIREX AND CHLORPHENIRAMINE POLISTIREX 10; 8 MG/5ML; MG/5ML
5 SUSPENSION, EXTENDED RELEASE ORAL
Qty: 60 ML | Refills: 0 | Status: SHIPPED | OUTPATIENT
Start: 2019-02-26 | End: 2019-03-03

## 2019-02-26 NOTE — DISCHARGE INSTRUCTIONS
Patient Education        Dolor de garganta: Instrucciones de cuidado - [ Sore Throat: Care Instructions ]  Instrucciones de cuidado    Sebastián infección por un virus o sebastián bacteria causa la mayoría de los emmie de garganta. El humo del cigarrillo, el aire seco, el aire contaminado, las [place] y gritar también pueden causar dolor de garganta. El dolor de garganta puede ser intenso y [place]. Por bryson, la mayoría de los emmie de garganta desaparecen por sí mismos. Si tiene [employer] [place] [employer], el médico podría recetarle antibióticos. La atención de seguimiento es sebastián parte clave de champion tratamiento y seguridad. Asegúrese de hacer y acudir a todas las citas, y llame a champion médico si está teniendo problemas. También es sebastián buena idea saber los resultados de jonathan exámenes y mantener sebastián lista de los medicamentos que wm. ¿Cómo puede cuidarse en el hogar? · Si champion médico le recetó antibióticos, tómelos según las indicaciones. No deje de tomarlos por el hecho de sentirse mejor. Debe kumar todos los antibióticos hasta terminarlos. · Harman gárgaras de agua salada tibia sebastián vez cada hora para ayudar a reducir la hinchazón y aliviar la molestia. Mezcle 1 cucharadita de sal en 1 taza de agua tibia. · [place] un analgésico (medicamento para el dolor) de venta julio, renate acetaminofén (Tylenol), ibuprofeno (Advil, Motrin) o naproxeno (Aleve). Sera y siga todas las instrucciones de la [place]. · Tenga cuidado cuando tome medicamentos de venta julio para el resfriado o la gripe y Tylenol al [employer]. Muchos de estos medicamentos contienen acetaminofén, o sea, Tylenol. Sera las etiquetas para asegurarse de que no está tomando sebastián dosis mayor que la recomendada. El exceso de acetaminofén (Tylenol) puede ser dañino. · [place] abundantes líquidos. Los líquidos podrían ayudar a aliviar la irritación de la garganta. Beber líquidos calientes, renate té o sopa, podría ayudarle a reducir el dolor de garganta.   · Chupe pastillas para la garganta de venta julio para aliviar el dolor. Los caramelos duros o los caramelos regulares para la tos también podrían ayudar. Nunca se los dé a un sandra pequeño porque corre el riesgo de atragantarse. · No fume ni permita que otros fumen cerca de usted. Si necesita ayuda para dejar de fumar, hable con champion médico [employer] y medicamentos para dejar de fumar. Estos pueden aumentar jonathan probabilidades de dejar el hábito para siempre. · Use un humidificador o vaporizador para añadir humedad en champion dormitorio. Siga las instrucciones para limpiar el aparato. ¿Cuándo debe pedir ayuda? Llame a champion médico ahora mismo o busque atención médica inmediata si:    · Tiene dificultades para tragar o estas empeoran.     · El dolor de garganta empeora mucho en un lado.    Preste especial atención a los cambios en champion naresh y asegúrese de comunicarse con champion médico si no mejora renate se esperaba. ¿Dónde puede encontrar más información en inglés? Felicia Gan a [email or web address]-sara.info/. Gregorio Res R860 en la búsqueda para aprender más acerca de \"Dolor de garganta: Instrucciones de cuidado - [ Sore Throat: Care Instructions ]. \"  Revisado: Rosemary 67, 2018  Versión del contenido: 11.9  © 5860-6336 Healthwise, Incorporated. Las instrucciones de cuidado fueron adaptadas bajo licencia por Good Help Connections (which disclaims liability or warranty for this information). Si usted tiene [place] afección médica o sobre estas instrucciones, siempre pregunte a champion profesional de naresh. Healthwise, Incorporated niega toda garantía o responsabilidad por champion uso de esta información.

## 2019-02-26 NOTE — ED TRIAGE NOTES
Pt arrives via POV from home, pt c/o sore throat, ear congestion and cough x3 months, seen for same in January, went away. Pt states also having some middle back pain.

## 2019-02-26 NOTE — PROGRESS NOTES
received a call to interpret over the phone for triage. Please call [facility] , directly by dialing \"9\" and then \"[phone]\" on the blue phone or [place] phone. 
  
[phone] [place] [phone]  available over the phone for all requests in the Emergency Room. Thank you, LA NENA Edmond / 
Tatiana Gutierrez Patient Relations & Interpreting Services 
c: [phone] / [email or web address] Hilary Jauregui [address] / [place], [address] [facility] 
[email or web address] [email or web address]

## 2019-02-26 NOTE — ED PROVIDER NOTES
Xochitl Sharif 
Pt seen by me in triage. Sore throat and mild cough. 3 months present. Seen here December and treated as URI. Similar to today. The history is provided by the patient. A  was used. Cough This is a new problem. Episode onset: 3 months. The problem has not changed since onset. The cough is non-productive. There has been no fever. Associated symptoms include sore throat. Pertinent negatives include no chest pain, no chills, no sweats, no eye redness, no ear congestion, no ear pain, no headaches, no rhinorrhea, no myalgias, no shortness of breath, no wheezing, no nausea and no vomiting. She has tried nothing for the symptoms. Past Medical History:  
Diagnosis Date  Other ill-defined conditions(474.17) broken rib, hernia Past Surgical History:  
Procedure Laterality Date  HX ORTHOPAEDIC    
 right hand No family history on file. Social History Socioeconomic History  Marital status: SINGLE Spouse name: Not on file  Number of children: Not on file  Years of education: Not on file  Highest education level: Not on file Social Needs  Financial resource strain: Not on file  Food insecurity - worry: Not on file  Food insecurity - inability: Not on file  Transportation needs - medical: Not on file  Transportation needs - non-medical: Not on file Occupational History  Not on file Tobacco Use  Smoking status: Never Smoker  Smokeless tobacco: Never Used Substance and Sexual Activity  Alcohol use: No  
 Drug use: No  
 Sexual activity: Not on file Other Topics Concern  Not on file Social History Narrative ** Merged History Encounter **  
    
 ** Merged History Encounter ** ALLERGIES: Patient has no known allergies. Review of Systems Constitutional: Negative for chills and fever. HENT: Positive for sore throat. Negative for ear pain and rhinorrhea. Eyes: Negative for pain and redness. Respiratory: Positive for cough. Negative for chest tightness, shortness of breath and wheezing. Cardiovascular: Negative for chest pain and leg swelling. Gastrointestinal: Negative for abdominal pain, diarrhea, nausea and vomiting. Musculoskeletal: Negative for back pain, gait problem, myalgias, neck pain and neck stiffness. Skin: Negative for color change and rash. Neurological: Negative for weakness, numbness and headaches. There were no vitals filed for this visit. Physical Exam  
Constitutional: She is oriented to person, place, and time. She appears well-developed and well-nourished. HENT:  
Head: Normocephalic and atraumatic. Mouth/Throat: Oropharynx is clear and moist. No oropharyngeal exudate. Neck: Normal range of motion. Neck supple. Cardiovascular: Normal rate and regular rhythm. Pulmonary/Chest: Effort normal and breath sounds normal.  
Abdominal: Soft. Bowel sounds are normal. There is no tenderness. Musculoskeletal: Normal range of motion. She exhibits no edema. Lymphadenopathy:  
  She has no cervical adenopathy. Neurological: She is alert and oriented to person, place, and time. Skin: Skin is warm and dry. MDM Number of Diagnoses or Management Options Diagnosis management comments: Pharyngitis with cough. Will discharge. Patient Progress Patient progress: stable Procedures

## 2019-05-06 ENCOUNTER — APPOINTMENT (OUTPATIENT)
Dept: GENERAL RADIOLOGY | Age: 59
End: 2019-05-06
Attending: EMERGENCY MEDICINE
Payer: SELF-PAY

## 2019-05-06 ENCOUNTER — HOSPITAL ENCOUNTER (EMERGENCY)
Age: 59
Discharge: HOME | End: 2019-05-07
Attending: EMERGENCY MEDICINE
Payer: SELF-PAY

## 2019-05-06 DIAGNOSIS — J18.9 PNEUMONIA OF LEFT LOWER LOBE DUE TO INFECTIOUS ORGANISM: Primary | ICD-10-CM

## 2019-05-06 LAB
ALBUMIN SERPL-MCNC: 3.4 G/DL (ref 3.5–5)
ALBUMIN/GLOB SERPL: 0.8 {RATIO} (ref 1.2–3.5)
ALP SERPL-CCNC: 128 U/L (ref 50–136)
ALT SERPL-CCNC: 33 U/L (ref 12–65)
ANION GAP SERPL CALC-SCNC: 7 MMOL/L (ref 7–16)
AST SERPL-CCNC: 33 U/L (ref 15–37)
BASOPHILS # BLD: 0 K/UL (ref 0–0.2)
BASOPHILS NFR BLD: 0 % (ref 0–2)
BILIRUB SERPL-MCNC: 0.4 MG/DL (ref 0.2–1.1)
BUN SERPL-MCNC: 11 MG/DL (ref 6–23)
CALCIUM SERPL-MCNC: 8.4 MG/DL (ref 8.3–10.4)
CHLORIDE SERPL-SCNC: 108 MMOL/L (ref 98–107)
CO2 SERPL-SCNC: 26 MMOL/L (ref 21–32)
CREAT SERPL-MCNC: 1 MG/DL (ref 0.6–1)
DIFFERENTIAL METHOD BLD: NORMAL
EOSINOPHIL # BLD: 0.1 K/UL (ref 0–0.8)
EOSINOPHIL NFR BLD: 2 % (ref 0.5–7.8)
ERYTHROCYTE [DISTWIDTH] IN BLOOD BY AUTOMATED COUNT: 12.5 % (ref 11.9–14.6)
GLOBULIN SER CALC-MCNC: 4.2 G/DL (ref 2.3–3.5)
GLUCOSE SERPL-MCNC: 123 MG/DL (ref 65–100)
HCT VFR BLD AUTO: 38.9 % (ref 35.8–46.3)
HGB BLD-MCNC: 13.6 G/DL (ref 11.7–15.4)
IMM GRANULOCYTES # BLD AUTO: 0 K/UL (ref 0–0.5)
IMM GRANULOCYTES NFR BLD AUTO: 0 % (ref 0–5)
LYMPHOCYTES # BLD: 1.1 K/UL (ref 0.5–4.6)
LYMPHOCYTES NFR BLD: 17 % (ref 13–44)
MCH RBC QN AUTO: 31.7 PG (ref 26.1–32.9)
MCHC RBC AUTO-ENTMCNC: 35 G/DL (ref 31.4–35)
MCV RBC AUTO: 90.7 FL (ref 79.6–97.8)
MONOCYTES # BLD: 0.5 K/UL (ref 0.1–1.3)
MONOCYTES NFR BLD: 7 % (ref 4–12)
NEUTS SEG # BLD: 5.1 K/UL (ref 1.7–8.2)
NEUTS SEG NFR BLD: 74 % (ref 43–78)
NRBC # BLD: 0 K/UL (ref 0–0.2)
PLATELET # BLD AUTO: 192 K/UL (ref 150–450)
PMV BLD AUTO: 11.5 FL (ref 9.4–12.3)
POTASSIUM SERPL-SCNC: 3.8 MMOL/L (ref 3.5–5.1)
PROT SERPL-MCNC: 7.6 G/DL (ref 6.3–8.2)
RBC # BLD AUTO: 4.29 M/UL (ref 4.05–5.2)
SODIUM SERPL-SCNC: 141 MMOL/L (ref 136–145)
WBC # BLD AUTO: 6.8 K/UL (ref 4.3–11.1)

## 2019-05-06 PROCEDURE — 84484 ASSAY OF TROPONIN QUANT: CPT

## 2019-05-06 PROCEDURE — 74011250636 HC RX REV CODE- 250/636: Performed by: EMERGENCY MEDICINE

## 2019-05-06 PROCEDURE — 94640 AIRWAY INHALATION TREATMENT: CPT

## 2019-05-06 PROCEDURE — 94760 N-INVAS EAR/PLS OXIMETRY 1: CPT | Performed by: EMERGENCY MEDICINE

## 2019-05-06 PROCEDURE — 99285 EMERGENCY DEPT VISIT HI MDM: CPT | Performed by: EMERGENCY MEDICINE

## 2019-05-06 PROCEDURE — 80053 COMPREHEN METABOLIC PANEL: CPT

## 2019-05-06 PROCEDURE — 71046 X-RAY EXAM CHEST 2 VIEWS: CPT

## 2019-05-06 PROCEDURE — 85025 COMPLETE CBC W/AUTO DIFF WBC: CPT

## 2019-05-06 PROCEDURE — 96375 TX/PRO/DX INJ NEW DRUG ADDON: CPT | Performed by: EMERGENCY MEDICINE

## 2019-05-06 PROCEDURE — 74011000250 HC RX REV CODE- 250: Performed by: EMERGENCY MEDICINE

## 2019-05-06 RX ORDER — ALBUTEROL SULFATE 0.83 MG/ML
5 SOLUTION RESPIRATORY (INHALATION)
Status: COMPLETED | OUTPATIENT
Start: 2019-05-06 | End: 2019-05-06

## 2019-05-06 RX ORDER — ALBUTEROL SULFATE 0.83 MG/ML
SOLUTION RESPIRATORY (INHALATION)
Status: DISCONTINUED
Start: 2019-05-06 | End: 2019-05-07 | Stop reason: HOSPADM

## 2019-05-06 RX ADMIN — METHYLPREDNISOLONE SODIUM SUCCINATE 125 MG: 125 INJECTION, POWDER, FOR SOLUTION INTRAMUSCULAR; INTRAVENOUS at 23:18

## 2019-05-06 RX ADMIN — ALBUTEROL SULFATE 5 MG: 2.5 SOLUTION RESPIRATORY (INHALATION) at 23:20

## 2019-05-07 VITALS
SYSTOLIC BLOOD PRESSURE: 135 MMHG | TEMPERATURE: 99 F | RESPIRATION RATE: 20 BRPM | OXYGEN SATURATION: 100 % | WEIGHT: 128 LBS | HEIGHT: 56 IN | DIASTOLIC BLOOD PRESSURE: 74 MMHG | HEART RATE: 79 BPM | BODY MASS INDEX: 28.79 KG/M2

## 2019-05-07 LAB
ATRIAL RATE: 83 BPM
CALCULATED P AXIS, ECG09: 44 DEGREES
CALCULATED R AXIS, ECG10: 52 DEGREES
CALCULATED T AXIS, ECG11: 54 DEGREES
DIAGNOSIS, 93000: NORMAL
P-R INTERVAL, ECG05: 136 MS
Q-T INTERVAL, ECG07: 364 MS
QRS DURATION, ECG06: 82 MS
QTC CALCULATION (BEZET), ECG08: 427 MS
TROPONIN I BLD-MCNC: 0 NG/ML (ref 0.02–0.05)
TROPONIN I SERPL-MCNC: <0.02 NG/ML (ref 0.02–0.05)
VENTRICULAR RATE, ECG03: 83 BPM

## 2019-05-07 PROCEDURE — 74011250636 HC RX REV CODE- 250/636: Performed by: EMERGENCY MEDICINE

## 2019-05-07 PROCEDURE — 93005 ELECTROCARDIOGRAM TRACING: CPT | Performed by: EMERGENCY MEDICINE

## 2019-05-07 PROCEDURE — 96365 THER/PROPH/DIAG IV INF INIT: CPT | Performed by: EMERGENCY MEDICINE

## 2019-05-07 RX ORDER — PREDNISONE 20 MG/1
40 TABLET ORAL DAILY
Qty: 8 TAB | Refills: 0 | Status: SHIPPED | OUTPATIENT
Start: 2019-05-07 | End: 2019-05-11

## 2019-05-07 RX ORDER — LEVOFLOXACIN 750 MG/1
750 TABLET ORAL DAILY
Qty: 7 TAB | Refills: 0 | Status: SHIPPED | OUTPATIENT
Start: 2019-05-07 | End: 2019-05-14

## 2019-05-07 RX ORDER — ALBUTEROL SULFATE 1.25 MG/3ML
1.25 SOLUTION RESPIRATORY (INHALATION)
Qty: 25 EACH | Refills: 0 | Status: SHIPPED | OUTPATIENT
Start: 2019-05-07

## 2019-05-07 RX ORDER — LEVOFLOXACIN 5 MG/ML
750 INJECTION, SOLUTION INTRAVENOUS
Status: COMPLETED | OUTPATIENT
Start: 2019-05-07 | End: 2019-05-07

## 2019-05-07 RX ADMIN — LEVOFLOXACIN 750 MG: 5 INJECTION, SOLUTION INTRAVENOUS at 01:11

## 2019-05-07 NOTE — DISCHARGE INSTRUCTIONS
AS WE DISCUSSED, YOU NEED TO RETURN IMMEDIATELY WITH ANY WORSENING COUGH OR ANY SHORTNESS OF BREATH OR ANY FEVERS OR CHILLS, NAUSEA OR VOMITING OR ANY FURTHER CONCERNS. OTHERWISE YOU NEED TO FOLLOW UP WITH YOUR PRIMARY CARE PROVIDER IN 1-2 DAYS FOR RECHECK.

## 2019-05-07 NOTE — PROGRESS NOTES
present for antibiotic with Noreen RN. Thank you, LA NENA Pichardo / 
Janalyn Kanner Patient Relations & Interpreting Services 
c: [phone] / [email or web address] Hilary Ja[address] / [place], [address] [facility] 
[email or web address]

## 2019-05-07 NOTE — ED NOTES
Patient ambulated with O2 monitoring. O2 saturation maintained  with a heart rate of 96. Patient reports improved breathing after breathing treatment. Taniya Perez MD notified.

## 2019-05-07 NOTE — PROGRESS NOTES
present over the phone for discharge instructions. Thank you, LA NENA Arzola / 
Yunior Moore Patient Relations & Interpreting Services 
c: [phone] / [email or web address] / [place], [facility] [address] [facility] 
[email or web address]. [facility]

## 2019-05-07 NOTE — PROGRESS NOTES
available on-site from 4:30 p.m. - 1:00 a.m. Please call [phone] with any interpreting requests. Thank you, LA NENA Roth / 
Jad Cherry Patient Relations & Interpreting Services 
c: [phone] / [email or web address] / [place], [address] [facility] 
[email or web address] [facility]

## 2019-05-07 NOTE — ED PROVIDER NOTES
Patient is 61 yo female who presents with cough and wheezing. States symptoms for the past 2 weeks and states has had sputum productive of yellow sputum which is non-bloody. States some pain in the middle of her back that occurs with coughing. States she is having some chest pain only with coughing. States some fevers and chills as well as body aches during this time. No abdominal pain, no nausea or vomiting, no further complaints. Overall patient is well appearing, in NAD. Past Medical History:  
Diagnosis Date  Other ill-defined conditions(799.89) broken rib, hernia Past Surgical History:  
Procedure Laterality Date  HX ORTHOPAEDIC    
 right hand No family history on file. Social History Socioeconomic History  Marital status: SINGLE Spouse name: Not on file  Number of children: Not on file  Years of education: Not on file  Highest education level: Not on file Occupational History  Not on file Social Needs  Financial resource strain: Not on file  Food insecurity:  
  Worry: Not on file Inability: Not on file  Transportation needs:  
  Medical: Not on file Non-medical: Not on file Tobacco Use  Smoking status: Never Smoker  Smokeless tobacco: Never Used Substance and Sexual Activity  Alcohol use: No  
 Drug use: No  
 Sexual activity: Not on file Lifestyle  Physical activity:  
  Days per week: Not on file Minutes per session: Not on file  Stress: Not on file Relationships  Social connections:  
  Talks on phone: Not on file Gets together: Not on file Attends Scientologist service: Not on file Active member of club or organization: Not on file Attends meetings of clubs or organizations: Not on file Relationship status: Not on file  Intimate partner violence:  
  Fear of current or ex partner: Not on file Emotionally abused: Not on file Physically abused: Not on file Forced sexual activity: Not on file Other Topics Concern  Not on file Social History Narrative ** Merged History Encounter **  
    
 ** Merged History Encounter ** ALLERGIES: Patient has no known allergies. Review of Systems Constitutional: Negative for chills and fever. HENT: Negative for rhinorrhea and sore throat. Eyes: Negative for visual disturbance. Respiratory: Positive for cough, shortness of breath and wheezing. Cardiovascular: Negative for leg swelling. Gastrointestinal: Negative for abdominal pain, diarrhea, nausea and vomiting. Genitourinary: Negative for dysuria. Musculoskeletal: Negative for back pain and neck pain. Skin: Negative for rash. Neurological: Negative for weakness and headaches. Psychiatric/Behavioral: The patient is not nervous/anxious. Vitals:  
 05/06/19 2104 05/06/19 2242 05/06/19 2247 BP: 131/75 136/79 Pulse: 87 89 Resp: 20 22 Temp: 99.7 °F (37.6 °C) 99.4 °F (37.4 °C) SpO2: 97% 99% 98% Weight: 58.1 kg (128 lb) Height: 4' 8\" (1.422 m) Physical Exam  
Constitutional: She is oriented to person, place, and time. She appears well-developed and well-nourished. HENT:  
Head: Normocephalic. Right Ear: External ear normal.  
Left Ear: External ear normal.  
Eyes: Pupils are equal, round, and reactive to light. Conjunctivae and EOM are normal.  
Neck: Normal range of motion. Neck supple. No tracheal deviation present. Cardiovascular: Normal rate, regular rhythm, normal heart sounds and intact distal pulses. No murmur heard. Pulmonary/Chest: Effort normal. No respiratory distress. She has wheezes. Abdominal: Soft. There is no tenderness. Musculoskeletal: Normal range of motion. Neurological: She is alert and oriented to person, place, and time. No cranial nerve deficit. Skin: No rash noted. Nursing note and vitals reviewed. MDM Number of Diagnoses or Management Options Pneumonia of left lower lobe due to infectious organism [facility]): new and requires workup Amount and/or Complexity of Data Reviewed Clinical lab tests: ordered and reviewed Tests in the radiology section of CPT®: ordered and reviewed Tests in the medicine section of CPT®: ordered and reviewed Review and summarize past medical records: yes Risk of Complications, Morbidity, and/or Mortality Presenting problems: high Diagnostic procedures: high Management options: high Patient Progress Patient progress: stable Procedures Recent Results (from the past 12 hour(s)) CBC WITH AUTOMATED DIFF Collection Time: 05/06/19  9:08 PM  
Result Value Ref Range WBC 6.8 4.3 - 11.1 K/uL  
 RBC 4.29 4.05 - 5.2 M/uL  
 HGB 13.6 11.7 - 15.4 g/dL HCT 38.9 35.8 - 46.3 % MCV 90.7 79.6 - 97.8 FL  
 MCH 31.7 26.1 - 32.9 PG  
 MCHC 35.0 31.4 - 35.0 g/dL  
 RDW 12.5 11.9 - 14.6 % PLATELET 510 351 - 799 K/uL MPV 11.5 9.4 - 12.3 FL ABSOLUTE NRBC 0.00 0.0 - 0.2 K/uL  
 DF AUTOMATED NEUTROPHILS 74 43 - 78 % LYMPHOCYTES 17 13 - 44 % MONOCYTES 7 4.0 - 12.0 % EOSINOPHILS 2 0.5 - 7.8 % BASOPHILS 0 0.0 - 2.0 % IMMATURE GRANULOCYTES 0 0.0 - 5.0 %  
 ABS. NEUTROPHILS 5.1 1.7 - 8.2 K/UL  
 ABS. LYMPHOCYTES 1.1 0.5 - 4.6 K/UL  
 ABS. MONOCYTES 0.5 0.1 - 1.3 K/UL  
 ABS. EOSINOPHILS 0.1 0.0 - 0.8 K/UL  
 ABS. BASOPHILS 0.0 0.0 - 0.2 K/UL  
 ABS. IMM. GRANS. 0.0 0.0 - 0.5 K/UL METABOLIC PANEL, COMPREHENSIVE Collection Time: 05/06/19  9:08 PM  
Result Value Ref Range Sodium 141 136 - 145 mmol/L Potassium 3.8 3.5 - 5.1 mmol/L Chloride 108 (H) 98 - 107 mmol/L  
 CO2 26 21 - 32 mmol/L Anion gap 7 7 - 16 mmol/L Glucose 123 (H) 65 - 100 mg/dL BUN 11 6 - 23 MG/DL Creatinine 1.00 0.6 - 1.0 MG/DL  
 GFR est AA >60 >60 ml/min/1.73m2 GFR est non-AA >60 >60 ml/min/1.73m2 Calcium 8.4 8.3 - 10.4 MG/DL  Bilirubin, total 0.4 0.2 - 1.1 MG/DL  
 ALT (SGPT) 33 12 - 65 U/L  
 AST (SGOT) 33 15 - 37 U/L Alk. phosphatase 128 50 - 136 U/L Protein, total 7.6 6.3 - 8.2 g/dL Albumin 3.4 (L) 3.5 - 5.0 g/dL Globulin 4.2 (H) 2.3 - 3.5 g/dL A-G Ratio 0.8 (L) 1.2 - 3.5 Xr Chest Pa Lat Result Date: 5/6/2019 TWO-VIEW CHEST: CLINICAL HISTORY: Cough. COMPARISON:  March 12, 2018. FINDINGS: PA and lateral chest images demonstrate no confluent pneumonic infiltrate or significant pleural fluid collection. However, there is patchy infiltrate/atelectasis in the lingula. The heart size is within normal limits without evidence of congestive heart failure or pneumothorax. The bony thorax appears intact on these views. IMPRESSION:  PATCHY LINGULAR ATELECTASIS/INFILTRATE WITHOUT CONSOLIDATIVE PNEUMONIA.  
 
EKG: NSR without acute ischemic process. 61 yo female with pneumonia: 
 
Patient is VERY well appearing, in NAD, she ambulates through-out the department without difficulty and maintains O2 sat while doing this. Given levaquin in ED and home with RX as well as steroids and inhaler and discussed at length importance of follow up with PCP or return immediately with any worsening cough or SOB or any further concerns.  used and all questions answered and patient in full agreement with plan.

## 2019-05-07 NOTE — ED NOTES
I have reviewed discharge instructions with the patient (via telephone ). The patient verbalized understanding. Patient left ED via Discharge Method: ambulatory to Home with . Opportunity for questions and clarification provided. Patient given 3 scripts. To continue your aftercare when you leave the hospital, you may receive an automated call from our care team to check in on how you are doing. This is a free service and part of our promise to provide the best care and service to meet your aftercare needs.  If you have questions, or wish to unsubscribe from this service please call [phone]. Thank you for Choosing our [facility] Emergency Department.

## 2019-05-07 NOTE — PROGRESS NOTES
present for walk with Noreen SMITH. Thank you, LA NENA Hogan / 
Fabby Rehman Patient Relations & Interpreting Services 
c: [phone] / [email or web address] / [place], [facility] [address] [facility] 
[email or web address] [email or web address]

## 2019-05-07 NOTE — ED TRIAGE NOTES
C/o productive cough with white sputum, onset 2 weeks pta. Reports fever onset this am. Attempted otc meds without relief. Reports chest and upper back pain onset after cough and states only painful when coughing or with deep inspiration.

## 2019-05-07 NOTE — PROGRESS NOTES
present over the phone for re-evaluation with Dr. Magy Holliday. Thank you, LA NENA Hogan / 
Fabby Rehman Patient Relations & Interpreting Services 
c: [phone] / [email or web address] Jauregui [address] / [place], [address] [facility] 
[email or web address]. [email or web address]

## 2019-05-07 NOTE — PROGRESS NOTES
present for assessment with Dr. Jus Fountain. Thank you, LA NENA Parra / 
Sen Echevarria Patient Relations & Interpreting Services 
c: [phone] / [email or web address] / [place], [facility] [address] [facility] 
[email or web address]

## 2019-05-07 NOTE — PROGRESS NOTES
present for medication with Noreen RN and breathing treatment. Thank you, Collins Jaime, LA NENA LARA / 
Tia Medrano Patient Relations & Interpreting Services 
c: [phone] / [email or web address] [address] 68 / [place], [address] [facility] 
[email or web address]. [email or web address]

## 2019-05-07 NOTE — PROGRESS NOTES
present for assessment with Noreen SMITH. Thank you, LA NENA Martinez / 
Elise Monahan Patient Relations & Interpreting Services 
c: [phone] / [email or web address] Jauregui [address] / [place], [address] [facility] 
[email or web address]. [facility]

## 2019-05-07 NOTE — PROGRESS NOTES
present for EKG. Thank you, LA NENA Underwood / 
Jefferey Carrel Patient Relations & Interpreting Services 
c: [phone] / [email or web address] Jauregui [address] / [place], [address] [facility] 
[email or web address]. [facility]

## 2019-05-08 ENCOUNTER — HOSPITAL ENCOUNTER (EMERGENCY)
Age: 59
Discharge: HOME | End: 2019-05-08
Attending: EMERGENCY MEDICINE
Payer: SELF-PAY

## 2019-05-08 VITALS
SYSTOLIC BLOOD PRESSURE: 153 MMHG | TEMPERATURE: 98.1 F | HEART RATE: 70 BPM | BODY MASS INDEX: 28.7 KG/M2 | RESPIRATION RATE: 16 BRPM | OXYGEN SATURATION: 96 % | DIASTOLIC BLOOD PRESSURE: 76 MMHG | WEIGHT: 128 LBS

## 2019-05-08 DIAGNOSIS — J18.9 PNEUMONIA DUE TO INFECTIOUS ORGANISM, UNSPECIFIED LATERALITY, UNSPECIFIED PART OF LUNG: Primary | ICD-10-CM

## 2019-05-08 PROCEDURE — 99283 EMERGENCY DEPT VISIT LOW MDM: CPT | Performed by: EMERGENCY MEDICINE

## 2019-05-08 RX ORDER — ALBUTEROL SULFATE 90 UG/1
2 AEROSOL, METERED RESPIRATORY (INHALATION)
Qty: 2 INHALER | Refills: 8 | Status: SHIPPED | OUTPATIENT
Start: 2019-05-08

## 2019-05-09 NOTE — PROGRESS NOTES
present for assessment with Shellie RIVERA, assessment with Karyn Mendoza RN, and discharge instructions. Thank you, LA NENA Gregg / 
Chacha Ríos Patient Relations & Interpreting Services 
c: [phone] / [email or web address] Jauregui [address] / [place], [address] [facility] 
[email or web address] [email or web address]

## 2019-05-09 NOTE — ED NOTES
I have reviewed discharge instructions with the patient. The patient verbalized understanding. Patient left ED via Discharge Method: ambulatory to Home with self. Opportunity for questions and clarification provided. Patient given 1 scripts. To continue your aftercare when you leave the hospital, you may receive an automated call from our care team to check in on how you are doing. This is a free service and part of our promise to provide the best care and service to meet your aftercare needs.  If you have questions, or wish to unsubscribe from this service please call [phone]. Thank you for Choosing our [employer] [facility] Emergency Department.

## 2019-05-09 NOTE — ED NOTES
Pt reports \"I just wanted to get checked out since I was her last time. \" denies any pain and reports feeling better since her last visit. Reports she has been taking her prescribed medications.

## 2019-05-09 NOTE — ED TRIAGE NOTES
Pt arrives stating instructed to return to ed for recheck. Seen here 5/6, diagnosed with left lower lobe pneumonia. Reports intermittent cough and \"wheezing\". Prescribed levaquin and prednisone. Reports feeling \"some better\"

## 2019-05-09 NOTE — ED PROVIDER NOTES
Patient states she has been having some coughing and wheezing. She states she was seen here a couple days ago and diagnosed with pneumonia. She got her prescriptions filled but she continues to cough and have some wheezing. She says it is better but she continues to have these symptoms. She was written for albuterol but has been unable to get this filled. She also has had some mild pain rating around to her back. She denies any aggravating or alleviating factors, states she has not been short of breath that has had mild wheezing at times. Elements of this note were created using speech recognition software. As such, errors of speech recognition may be present. Past Medical History:  
Diagnosis Date  Other ill-defined conditions(269.09) broken rib, hernia Past Surgical History:  
Procedure Laterality Date  HX ORTHOPAEDIC    
 right hand No family history on file. Social History Socioeconomic History  Marital status: SINGLE Spouse name: Not on file  Number of children: Not on file  Years of education: Not on file  Highest education level: Not on file Occupational History  Not on file Social Needs  Financial resource strain: Not on file  Food insecurity:  
  Worry: Not on file Inability: Not on file  Transportation needs:  
  Medical: Not on file Non-medical: Not on file Tobacco Use  Smoking status: Never Smoker  Smokeless tobacco: Never Used Substance and Sexual Activity  Alcohol use: No  
 Drug use: No  
 Sexual activity: Not on file Lifestyle  Physical activity:  
  Days per week: Not on file Minutes per session: Not on file  Stress: Not on file Relationships  Social connections:  
  Talks on phone: Not on file Gets together: Not on file Attends Latter day service: Not on file Active member of club or organization: Not on file Attends meetings of clubs or organizations: Not on file Relationship status: Not on file  Intimate partner violence:  
  Fear of current or ex partner: Not on file Emotionally abused: Not on file Physically abused: Not on file Forced sexual activity: Not on file Other Topics Concern  Not on file Social History Narrative ** Merged History Encounter **  
    
 ** Merged History Encounter ** ALLERGIES: Patient has no known allergies. Review of Systems Constitutional: Negative for chills and fever. Respiratory: Positive for cough and wheezing. Gastrointestinal: Negative for nausea and vomiting. All other systems reviewed and are negative. Vitals:  
 05/08/19 2022 BP: 155/84 Pulse: 76 Resp: 18 Temp: 98 °F (36.7 °C) SpO2: 95% Weight: 58.1 kg (128 lb) Physical Exam  
Constitutional: She is oriented to person, place, and time. She appears well-developed and well-nourished. HENT:  
Head: Normocephalic and atraumatic. Eyes: Pupils are equal, round, and reactive to light. Conjunctivae are normal.  
Neck: Normal range of motion. Neck supple. Cardiovascular: Normal rate, regular rhythm and normal heart sounds. Pulmonary/Chest: Effort normal and breath sounds normal.  
Abdominal: Soft. Bowel sounds are normal.  
Musculoskeletal: She exhibits no edema or tenderness. Neurological: She is alert and oriented to person, place, and time. Skin: Skin is warm and dry. Psychiatric: She has a normal mood and affect. Her behavior is normal.  
Nursing note and vitals reviewed. MDM Number of Diagnoses or Management Options Pneumonia due to infectious organism, unspecified laterality, unspecified part of lung: established and improving Diagnosis management comments: 10:38 PM Reviewed visit from a couple days ago, chest x-ray shows some linear atelectasis.   We treated her as pneumonia with antibiotics. She is improving on Levaquin. The plan is to write for an albuterol inhaler and have her follow up with [facility]. Amount and/or Complexity of Data Reviewed Decide to obtain previous medical records or to obtain history from someone other than the patient: yes Review and summarize past medical records: yes Risk of Complications, Morbidity, and/or Mortality Presenting problems: moderate Diagnostic procedures: moderate Management options: moderate Patient Progress Patient progress: stable Procedures

## 2019-05-09 NOTE — DISCHARGE INSTRUCTIONS
Follow-up with [employer], call the office to make an appointment. Return to the emergency department if your symptoms worsen despite the prescription medications.

## 2019-05-09 NOTE — PROGRESS NOTES
present for registration and triage. Thank you, LA NENA Molina / 
Daisy Wells Patient Relations & Interpreting Services 
c: [phone] / [email or web address] [address] / [place], [address] [facility] 
[email or web address]

## 2020-12-09 NOTE — ED TRIAGE NOTES
#[id number]--Patient to ed via pov with c/o sore throat--patient reports symptoms since Friday--patient denies fever at home--patient also states she had body aches Called and informed patient. Verbal compliance and compliance noted.

## 2021-10-15 PROCEDURE — 99283 EMERGENCY DEPT VISIT LOW MDM: CPT

## 2021-10-16 ENCOUNTER — HOSPITAL ENCOUNTER (EMERGENCY)
Age: 61
Discharge: HOME | End: 2021-10-16
Attending: EMERGENCY MEDICINE

## 2021-10-16 ENCOUNTER — APPOINTMENT (OUTPATIENT)
Dept: GENERAL RADIOLOGY | Age: 61
End: 2021-10-16
Attending: EMERGENCY MEDICINE

## 2021-10-16 VITALS
TEMPERATURE: 97.8 F | WEIGHT: 127 LBS | DIASTOLIC BLOOD PRESSURE: 70 MMHG | SYSTOLIC BLOOD PRESSURE: 154 MMHG | BODY MASS INDEX: 28.47 KG/M2 | OXYGEN SATURATION: 97 % | RESPIRATION RATE: 18 BRPM | HEART RATE: 70 BPM

## 2021-10-16 DIAGNOSIS — M54.41 MIDLINE LOW BACK PAIN WITH RIGHT-SIDED SCIATICA, UNSPECIFIED CHRONICITY: Primary | ICD-10-CM

## 2021-10-16 PROCEDURE — 72100 X-RAY EXAM L-S SPINE 2/3 VWS: CPT

## 2021-10-16 RX ORDER — DEXAMETHASONE 6 MG/1
TABLET ORAL
Qty: 5 TABLET | Refills: 0 | Status: SHIPPED | OUTPATIENT
Start: 2021-10-16

## 2021-10-16 NOTE — DISCHARGE INSTRUCTIONS
Follow-up with [facility], call them to make an appointment. You will likely need an MRI of your lumbar spine, this can be arranged through [facility]. Return to the emergency department if your symptoms worsen.

## 2021-10-16 NOTE — ED NOTES
I have reviewed discharge instructions with the patient. The patient verbalized understanding. Used [facility] services # [id number]    Patient left ED via Discharge Method: ambulatory to Home with boyfriend    Patient given 1 scripts. To continue your aftercare when you leave the hospital, you may receive an automated call from our care team to check in on how you are doing. This is a free service and part of our promise to provide the best care and service to meet your aftercare needs.  If you have questions, or wish to unsubscribe from this service please call [phone]. Thank you for Choosing our [facility] Emergency Department.

## 2021-10-16 NOTE — ED TRIAGE NOTES
Arrives with face mask in place. Ambulatory with steady gait to triage. Reports mid to lower back pain radiating down RLE. Describes as \"burning\". Onset approx 7 months pta. States mis-stepped at time. Intermittent pain since. States increased pain to right knee. Denies fever/chills, n/v/d, urinary symptoms.  Attempted motrin without relief

## 2021-10-16 NOTE — ED PROVIDER NOTES
Patient is a 19-year-old  female with no significant past medical history who presents for back pain. She states that she stepped awkwardly 6 to 7 months ago and felt a pulling in her back. She has had pain since that time. It is constant achy burning pain in her midline lower back worse when she works during the day and better when she wakes up in the morning. She has taken ibuprofen which recently has not been helping. The pain recently has been radiating to her right knee. She denies any weakness or paresthesias in her legs, denies any urinary incontinence or retention. She does not currently have a primary doctor, has not sought medical care for this problem. Past Medical History:   Diagnosis Date    Other ill-defined conditions(302.26)     broken rib, hernia       Past Surgical History:   Procedure Laterality Date    HX ORTHOPAEDIC      right hand          No family history on file. Social History     Socioeconomic History    Marital status: SINGLE     Spouse name: Not on file    Number of children: Not on file    Years of education: Not on file    Highest education level: Not on file   Occupational History    Not on file   Tobacco Use    Smoking status: Never Smoker    Smokeless tobacco: Never Used   Substance and Sexual Activity    Alcohol use: No    Drug use: No    Sexual activity: Not on file   Other Topics Concern    Not on file   Social History Narrative    ** Merged History Encounter **         ** Merged History Encounter **          Social Determinants of Health     Financial Resource Strain:     Difficulty of Paying Living Expenses:    Food Insecurity:     Worried About Running Out of Food in the Last Year:     [address] in the Last Year:    Transportation Needs:     Lack of Transportation (Medical):      Lack of Transportation (Non-Medical):    Physical Activity:     Days of Exercise per Week:     Minutes of Exercise per Session:    Stress:     Feeling of Stress :    Social Connections:     Frequency of Communication with Friends and Family:     Frequency of Social Gatherings with Friends and Family:     Attends Yarsani Services:     Active Member of Clubs or Organizations:     Attends Club or Organization Meetings:     Marital Status:    Intimate Partner Violence:     Fear of Current or Ex-Partner:     Emotionally Abused:     Physically Abused:     Sexually Abused: ALLERGIES: Patient has no known allergies. Review of Systems   Constitutional: Negative for chills and fever. Gastrointestinal: Negative for nausea and vomiting. All other systems reviewed and are negative. Vitals:    10/15/21 2231   BP: (!) 154/70   Pulse: 71   Resp: 16   Temp: 97.9 °F (36.6 °C)   SpO2: 97%   Weight: 57.6 kg (127 lb)            Physical Exam  Vitals and nursing note reviewed. Constitutional:       Appearance: Normal appearance. She is well-developed. HENT:      Head: Normocephalic and atraumatic. Eyes:      Conjunctiva/sclera: Conjunctivae normal.      Pupils: Pupils are equal, round, and reactive to light. Pulmonary:      Effort: Pulmonary effort is normal. No respiratory distress. Musculoskeletal:         General: Tenderness present. No swelling. Cervical back: Normal range of motion and neck supple. Back:       Comments: Midline tenderness to palpation lower back as indicated   Skin:     General: Skin is warm and dry. Neurological:      General: No focal deficit present. Mental Status: She is alert and oriented to person, place, and time. Comments: 5/5 strength bilateral lower extremities in knee extension and ankle flexion/extension. Normal patellar reflexes bilateral lower extremities.    Psychiatric:         Behavior: Behavior normal.          MDM  Number of Diagnoses or Management Options  Midline low back pain with right-sided sciatica, unspecified chronicity: new and requires workup  Diagnosis management comments: 3:14 AM discussed results with patient, need for close outpatient follow-up. Discussed need for outpatient MRI. We will treat her empirically with steroids.        Amount and/or Complexity of Data Reviewed  Tests in the radiology section of CPT®: ordered and reviewed    Risk of Complications, Morbidity, and/or Mortality  Presenting problems: moderate  Diagnostic procedures: moderate  Management options: moderate    Patient Progress  Patient progress: stable         Procedures

## 2022-08-07 ENCOUNTER — APPOINTMENT (OUTPATIENT)
Dept: GENERAL RADIOLOGY | Age: 62
End: 2022-08-07

## 2022-08-07 ENCOUNTER — HOSPITAL ENCOUNTER (EMERGENCY)
Age: 62
Discharge: HOME | End: 2022-08-08
Attending: EMERGENCY MEDICINE

## 2022-08-07 DIAGNOSIS — R07.89 ATYPICAL CHEST PAIN: Primary | ICD-10-CM

## 2022-08-07 LAB
ALBUMIN SERPL-MCNC: 3.5 G/DL (ref 3.2–4.6)
ALBUMIN/GLOB SERPL: 0.9 {RATIO} (ref 1.2–3.5)
ALP SERPL-CCNC: 109 U/L (ref 50–136)
ALT SERPL-CCNC: 40 U/L (ref 12–65)
ANION GAP SERPL CALC-SCNC: 6 MMOL/L (ref 7–16)
AST SERPL-CCNC: 35 U/L (ref 15–37)
BILIRUB SERPL-MCNC: 0.4 MG/DL (ref 0.2–1.1)
BUN SERPL-MCNC: 18 MG/DL (ref 8–23)
CALCIUM SERPL-MCNC: 8.6 MG/DL (ref 8.3–10.4)
CHLORIDE SERPL-SCNC: 112 MMOL/L (ref 98–107)
CO2 SERPL-SCNC: 24 MMOL/L (ref 21–32)
CREAT SERPL-MCNC: 1 MG/DL (ref 0.6–1)
ERYTHROCYTE [DISTWIDTH] IN BLOOD BY AUTOMATED COUNT: 12.9 % (ref 11.9–14.6)
GLOBULIN SER CALC-MCNC: 3.7 G/DL (ref 2.3–3.5)
GLUCOSE SERPL-MCNC: 111 MG/DL (ref 65–100)
HCT VFR BLD AUTO: 40.1 % (ref 35.8–46.3)
HGB BLD-MCNC: 13.8 G/DL (ref 11.7–15.4)
MCH RBC QN AUTO: 31.2 PG (ref 26.1–32.9)
MCHC RBC AUTO-ENTMCNC: 34.4 G/DL (ref 31.4–35)
MCV RBC AUTO: 90.5 FL (ref 79.6–97.8)
NRBC # BLD: 0 K/UL (ref 0–0.2)
PLATELET # BLD AUTO: 196 K/UL (ref 150–450)
PMV BLD AUTO: 11.4 FL (ref 9.4–12.3)
POTASSIUM SERPL-SCNC: 3.9 MMOL/L (ref 3.5–5.1)
PROT SERPL-MCNC: 7.2 G/DL (ref 6.3–8.2)
RBC # BLD AUTO: 4.43 M/UL (ref 4.05–5.2)
SODIUM SERPL-SCNC: 142 MMOL/L (ref 136–145)
TROPONIN I SERPL HS-MCNC: 3.8 PG/ML (ref 0–14)
WBC # BLD AUTO: 5.5 K/UL (ref 4.3–11.1)

## 2022-08-07 PROCEDURE — 87635 SARS-COV-2 COVID-19 AMP PRB: CPT

## 2022-08-07 PROCEDURE — 85027 COMPLETE CBC AUTOMATED: CPT

## 2022-08-07 PROCEDURE — 96375 TX/PRO/DX INJ NEW DRUG ADDON: CPT

## 2022-08-07 PROCEDURE — 96374 THER/PROPH/DIAG INJ IV PUSH: CPT

## 2022-08-07 PROCEDURE — 80053 COMPREHEN METABOLIC PANEL: CPT

## 2022-08-07 PROCEDURE — 71045 X-RAY EXAM CHEST 1 VIEW: CPT

## 2022-08-07 PROCEDURE — 84484 ASSAY OF TROPONIN QUANT: CPT

## 2022-08-07 PROCEDURE — 93005 ELECTROCARDIOGRAM TRACING: CPT | Performed by: EMERGENCY MEDICINE

## 2022-08-07 PROCEDURE — 6360000002 HC RX W HCPCS: Performed by: EMERGENCY MEDICINE

## 2022-08-07 PROCEDURE — 99285 EMERGENCY DEPT VISIT HI MDM: CPT

## 2022-08-07 RX ORDER — KETOROLAC TROMETHAMINE 30 MG/ML
30 INJECTION, SOLUTION INTRAMUSCULAR; INTRAVENOUS ONCE
Status: COMPLETED | OUTPATIENT
Start: 2022-08-07 | End: 2022-08-07

## 2022-08-07 RX ORDER — DEXAMETHASONE SODIUM PHOSPHATE 10 MG/ML
10 INJECTION INTRAMUSCULAR; INTRAVENOUS
Status: COMPLETED | OUTPATIENT
Start: 2022-08-07 | End: 2022-08-07

## 2022-08-07 RX ADMIN — DEXAMETHASONE SODIUM PHOSPHATE 10 MG: 10 INJECTION INTRAMUSCULAR; INTRAVENOUS at 23:57

## 2022-08-07 RX ADMIN — KETOROLAC TROMETHAMINE 30 MG: 30 INJECTION, SOLUTION INTRAMUSCULAR at 23:57

## 2022-08-07 ASSESSMENT — PAIN - FUNCTIONAL ASSESSMENT: PAIN_FUNCTIONAL_ASSESSMENT: 0-10

## 2022-08-07 ASSESSMENT — PAIN SCALES - GENERAL: PAINLEVEL_OUTOF10: 8

## 2022-08-08 VITALS
HEIGHT: 57 IN | SYSTOLIC BLOOD PRESSURE: 119 MMHG | HEART RATE: 57 BPM | TEMPERATURE: 98.4 F | OXYGEN SATURATION: 95 % | WEIGHT: 126 LBS | DIASTOLIC BLOOD PRESSURE: 74 MMHG | BODY MASS INDEX: 27.18 KG/M2 | RESPIRATION RATE: 16 BRPM

## 2022-08-08 LAB
EKG ATRIAL RATE: 66 BPM
EKG DIAGNOSIS: NORMAL
EKG P AXIS: 59 DEGREES
EKG P-R INTERVAL: 154 MS
EKG Q-T INTERVAL: 402 MS
EKG QRS DURATION: 82 MS
EKG QTC CALCULATION (BAZETT): 421 MS
EKG R AXIS: 94 DEGREES
EKG T AXIS: 39 DEGREES
EKG VENTRICULAR RATE: 66 BPM
SARS-COV-2 RDRP RESP QL NAA+PROBE: NOT DETECTED
SOURCE: NORMAL
TROPONIN I SERPL HS-MCNC: 4.1 PG/ML (ref 0–14)

## 2022-08-08 PROCEDURE — 84484 ASSAY OF TROPONIN QUANT: CPT

## 2022-08-08 ASSESSMENT — ENCOUNTER SYMPTOMS
CHEST TIGHTNESS: 1
GASTROINTESTINAL NEGATIVE: 1

## 2022-08-08 NOTE — ED PROVIDER NOTES
[facility] Emergency Department Provider Note                   PCP:                None Provider               Age: 58 y.o. Sex: female       ICD-10-CM    1. Atypical chest pain  R07.89           DISPOSITION Decision To Discharge 08/08/2022 01:07:08 AM       MDM  Number of Diagnoses or Management Options  Atypical chest pain  Diagnosis management comments: 66-year-old  female present emergency department with complaints of chest pain, chills, headache, ear pain that been ongoing for the past 2 to 3 days. Patient labs, imaging, EKG and physical exam are reassuring. Patient's chest pain is reproducible on anterior chest wall. Patient will be discharged home. She is feeling better after her Adderall and Decadron. She will return the emergency department for any concerns. Patient will be given referral to cardiology as well. Amount and/or Complexity of Data Reviewed  Clinical lab tests: ordered and reviewed  Tests in the radiology section of CPT®: ordered and reviewed  Decide to obtain previous medical records or to obtain history from someone other than the patient: yes  Review and summarize past medical records: yes  Independent visualization of images, tracings, or specimens: yes    Patient Progress  Patient progress: improved       Orders Placed This Encounter   Procedures    COVID-19, Rapid    XR CHEST PORTABLE    CBC    Comprehensive Metabolic Panel    Troponin    Cardiac Monitor    Pulse Oximetry    EKG 12 Lead    Saline lock IV        Ash Del Cid is a 58 y.o. female who presents to the Emergency Department with chief complaint of    Chief Complaint   Patient presents with    Chest Pain    Migraine      66-year-old  female with history of high blood pressure and high cholesterol presented to the emergency department with complaints of headache, ear pain, chest pain and chills that have been ongoing for the past few days. Patient states that she has had pain like this before. She is unsure what the etiology of her pain has been previously. Patient denies any fevers, cough, congestion, shortness of breath, abdominal pain, changes in bowel or bladder habits or any other concerns. Patient states that the pain is sharp and is located in the left center of her chest.  It is painful to palpation. She states that there is no pattern to her pain. It can happen with exertion or at rest.  It is intermittent in nature. She describes it as sharp. It does not hurt when she takes a deep breath. It does not radiate anywhere. She has no other complaints at this time. The history is provided by the patient and a relative. The history is limited by a language barrier. A  was used. All other systems reviewed and are negative. Review of Systems   Constitutional:  Positive for chills and fatigue. HENT:  Positive for ear pain. Respiratory:  Positive for chest tightness. Cardiovascular:  Positive for chest pain. Gastrointestinal: Negative. Genitourinary: Negative. Musculoskeletal: Negative. Skin: Negative. Neurological:  Positive for headaches. Psychiatric/Behavioral: Negative. All other systems reviewed and are negative. No past medical history on file. Past Surgical History:   Procedure Laterality Date    ORTHOPEDIC SURGERY      right hand         No family history on file. Social History     Socioeconomic History    Marital status: Single   Tobacco Use    Smoking status: Never    Smokeless tobacco: Never   Substance and Sexual Activity    Alcohol use: No    Drug use: No   Social History Narrative         ** Merged History Encounter **         ** Merged History Encounter **        Allergies: Patient has no known allergies.     Previous Medications    ALBUTEROL (ACCUNEB) 1.25 MG/3ML NEBULIZER SOLUTION    Inhale 1.25 mg into the lungs every 4 hours as needed    ALBUTEROL SULFATE  (90 BASE) MCG/ACT INHALER    Inhale 2 puffs into the lungs every 4 hours as needed    DEXAMETHASONE (DECADRON) 6 MG TABLET    Take 6mg daily for 5 days        Vitals signs and nursing note reviewed. Patient Vitals for the past 4 hrs:   Pulse Resp BP SpO2   08/08/22 0030 57 16 122/67 95 %   08/07/22 2358 56 16 (!) 144/74 95 %          Physical Exam  Vitals and nursing note reviewed. Constitutional:       General: She is not in acute distress. Appearance: Normal appearance. She is not ill-appearing or toxic-appearing. HENT:      Head: Normocephalic and atraumatic. Right Ear: Ear canal normal.      Left Ear: Ear canal normal.      Ears:      Comments: TMs are dull bilaterally. No erythema     Mouth/Throat:      Mouth: Mucous membranes are moist.   Eyes:      Extraocular Movements: Extraocular movements intact. Pupils: Pupils are equal, round, and reactive to light. Cardiovascular:      Rate and Rhythm: Normal rate and regular rhythm. Pulses: Normal pulses. Heart sounds: Normal heart sounds. Pulmonary:      Effort: Pulmonary effort is normal.      Breath sounds: Normal breath sounds. Chest:      Chest wall: Tenderness (Reproducible anterior chest wall pain along the sternal costal border) present. Abdominal:      Palpations: Abdomen is soft. Tenderness: There is no abdominal tenderness. There is no guarding or rebound. Musculoskeletal:         General: Normal range of motion. Cervical back: Normal range of motion. Skin:     General: Skin is warm and dry. Neurological:      General: No focal deficit present. Mental Status: She is alert and oriented to person, place, and time. Psychiatric:         Mood and Affect: Mood normal.         Thought Content: Thought content normal.         Judgment: Judgment normal.          ED EKG Interpretation  EKG was interpreted in the absence of a cardiologist.    Rate: Rate 66  EKG Interpretation: Normal sinus rhythm. Normal MN and QT intervals. Rightward axis.   ST Segments: Nonspecific ST segments - NO STEMI    Labs Reviewed   COMPREHENSIVE METABOLIC PANEL - Abnormal; Notable for the following components:       Result Value    Chloride 112 (*)     Anion Gap 6 (*)     Glucose 111 (*)     GFR Non- 60 (*)     Globulin 3.7 (*)     Albumin/Globulin Ratio 0.9 (*)     All other components within normal limits   COVID-19, RAPID   CBC   TROPONIN   TROPONIN        XR CHEST PORTABLE   Final Result   No acute process. ED Course as of 08/08/22 0109   Mon Aug 08, 2022   0106 Patient states she is feeling better. Troponins are negative x2. Patient will be discharged home with follow-up with cardiology. [JL]      ED Course User Index  [JL] Mo Leon MD        Voice dictation software was used during the making of this note. This software is not perfect and grammatical and other typographical errors may be present. This note has not been completely proofread for errors.        Mo Leon MD  08/08/22 1862

## 2022-08-08 NOTE — ED NOTES
Pt arrives via POV with family c/o CP, Chills and migraine headaches that started yesterday. Pt states the pains started years ago and have progressively worsening. Pt describes pain that moves down her left arm. No covid test recently. No other complaints at time.       Cierra Jackson RN  08/07/22 2053

## 2023-09-19 ENCOUNTER — APPOINTMENT (OUTPATIENT)
Dept: GENERAL RADIOLOGY | Age: 63
End: 2023-09-19

## 2023-09-19 ENCOUNTER — HOSPITAL ENCOUNTER (EMERGENCY)
Age: 63
Discharge: HOME | End: 2023-09-19
Attending: EMERGENCY MEDICINE

## 2023-09-19 VITALS
HEART RATE: 71 BPM | RESPIRATION RATE: 16 BRPM | HEIGHT: 57 IN | SYSTOLIC BLOOD PRESSURE: 127 MMHG | OXYGEN SATURATION: 97 % | WEIGHT: 126 LBS | BODY MASS INDEX: 27.18 KG/M2 | TEMPERATURE: 99 F | DIASTOLIC BLOOD PRESSURE: 77 MMHG

## 2023-09-19 DIAGNOSIS — J06.9 VIRAL URI WITH COUGH: Primary | ICD-10-CM

## 2023-09-19 LAB
SARS-COV-2 RDRP RESP QL NAA+PROBE: NOT DETECTED
SOURCE: NORMAL

## 2023-09-19 PROCEDURE — 99284 EMERGENCY DEPT VISIT MOD MDM: CPT

## 2023-09-19 PROCEDURE — 87635 SARS-COV-2 COVID-19 AMP PRB: CPT

## 2023-09-19 PROCEDURE — 71045 X-RAY EXAM CHEST 1 VIEW: CPT

## 2023-09-19 RX ORDER — PREDNISONE 20 MG/1
40 TABLET ORAL DAILY
Qty: 6 TABLET | Refills: 0 | Status: SHIPPED | OUTPATIENT
Start: 2023-09-19 | End: 2023-09-22

## 2023-09-19 ASSESSMENT — PAIN - FUNCTIONAL ASSESSMENT: PAIN_FUNCTIONAL_ASSESSMENT: 0-10

## 2023-09-19 ASSESSMENT — LIFESTYLE VARIABLES
HOW MANY STANDARD DRINKS CONTAINING ALCOHOL DO YOU HAVE ON A TYPICAL DAY: PATIENT DOES NOT DRINK
HOW OFTEN DO YOU HAVE A DRINK CONTAINING ALCOHOL: NEVER

## 2023-09-19 ASSESSMENT — PAIN SCALES - GENERAL: PAINLEVEL_OUTOF10: 8

## 2023-09-20 NOTE — ED PROVIDER NOTES
acute intrathoracic process. Felix Lipscomb M.D.   9/19/2023 9:23:00 PM        XR CHEST 1 VIEW   Final Result   1. No acute intrathoracic process. Felix Lipscomb M.D.    9/19/2023 9:23:00 PM            Voice dictation software was used during the making of this note. This software is not perfect and grammatical and other typographical errors may be present. This note has not been completely proofread for errors.      [place], [place]  09/19/23 [id number]

## 2023-09-20 NOTE — DISCHARGE INSTRUCTIONS
Her COVID test resulted negative. Her chest x-ray did not show any signs of a developing pneumonia. Still, I suspect that she has a viral illness. Continue Tylenol Cold and flu. I am going to place her on steroids for the next 3 days. Take them as directed.   Please follow-up with the primary care physician

## 2023-09-20 NOTE — ED TRIAGE NOTES
Patient to ER form home with son states she has been hoarse x10 days and feels urge to cough, but cannot as her throat is so sore. Started with ear popping, does sneeze, 3/4 days ago lost voice, denies fever and is congested.

## 2024-01-17 ENCOUNTER — HOSPITAL ENCOUNTER (EMERGENCY)
Age: 64
Discharge: HOME | End: 2024-01-17
Attending: EMERGENCY MEDICINE | Admitting: EMERGENCY MEDICINE

## 2024-01-17 VITALS
RESPIRATION RATE: 20 BRPM | SYSTOLIC BLOOD PRESSURE: 167 MMHG | OXYGEN SATURATION: 99 % | HEIGHT: 57 IN | TEMPERATURE: 97.7 F | BODY MASS INDEX: 28.05 KG/M2 | WEIGHT: 130 LBS | DIASTOLIC BLOOD PRESSURE: 99 MMHG | HEART RATE: 76 BPM

## 2024-01-17 DIAGNOSIS — N39.0 URINARY TRACT INFECTION WITH HEMATURIA, SITE UNSPECIFIED: Primary | ICD-10-CM

## 2024-01-17 DIAGNOSIS — R31.9 URINARY TRACT INFECTION WITH HEMATURIA, SITE UNSPECIFIED: Primary | ICD-10-CM

## 2024-01-17 LAB
BACTERIA URNS QL MICRO: NEGATIVE /HPF
BILIRUB UR QL: NEGATIVE
CASTS URNS QL MICRO: ABNORMAL /LPF
EPI CELLS #/AREA URNS HPF: ABNORMAL /HPF
GLUCOSE UR QL STRIP.AUTO: NEGATIVE MG/DL
KETONES UR-MCNC: NEGATIVE MG/DL
LEUKOCYTE ESTERASE UR QL STRIP: ABNORMAL
NITRITE UR QL: NEGATIVE
PH UR: 5.5 (ref 5–9)
PROT UR QL: 100 MG/DL
RBC # UR STRIP: ABNORMAL
RBC #/AREA URNS HPF: ABNORMAL /HPF
SERVICE CMNT-IMP: ABNORMAL
SP GR UR: 1.01 (ref 1–1.02)
UROBILINOGEN UR QL: 0.2 EU/DL (ref 0.2–1)
WBC URNS QL MICRO: >100 /HPF

## 2024-01-17 PROCEDURE — 99283 EMERGENCY DEPT VISIT LOW MDM: CPT

## 2024-01-17 PROCEDURE — 81003 URINALYSIS AUTO W/O SCOPE: CPT

## 2024-01-17 PROCEDURE — 81001 URINALYSIS AUTO W/SCOPE: CPT

## 2024-01-17 PROCEDURE — 81015 MICROSCOPIC EXAM OF URINE: CPT

## 2024-01-17 RX ORDER — CEPHALEXIN 500 MG/1
500 CAPSULE ORAL 2 TIMES DAILY
Qty: 14 CAPSULE | Refills: 0 | Status: SHIPPED | OUTPATIENT
Start: 2024-01-17 | End: 2024-01-24

## 2024-01-17 ASSESSMENT — PAIN SCALES - GENERAL: PAINLEVEL_OUTOF10: 8

## 2024-01-17 ASSESSMENT — PAIN - FUNCTIONAL ASSESSMENT: PAIN_FUNCTIONAL_ASSESSMENT: 0-10

## 2024-01-17 ASSESSMENT — PAIN DESCRIPTION - LOCATION: LOCATION: ABDOMEN;VAGINA

## 2024-01-17 NOTE — ED NOTES
I have reviewed discharge instructions with the patient.  The patient verbalized understanding.    Patient left ED via Discharge Method: ambulatory to Home with friend    Opportunity for questions and clarification provided.       Patient given 1 scripts.         To continue your aftercare when you leave the hospital, you may receive an automated call from our care team to check in on how you are doing.  This is a free service and part of our promise to provide the best care and service to meet your aftercare needs.” If you have questions, or wish to unsubscribe from this service please call [phone].  Thank you for Choosing our [facility] Emergency Department.        Jaimee Lynn RN  01/17/24 1392

## 2024-01-17 NOTE — DISCHARGE INSTRUCTIONS
Take keflex twice daily for the next 7 days. Use tylenol or ibuprofen as needed for discomfort. Return for any worsening symptoms or concerns.     [place] keflex dos veces al día jaylene los próximos 7 días. Use tylenol o ibuprofeno según sea necesario para el malestar. Regrese si tiene algún síntoma o inquietud que empeore.

## 2024-01-17 NOTE — ED TRIAGE NOTES
Pt complains of polyuria and dysuria. Pt also complains of hematuria. Pt denies any vaginal bleeding. Reports pink blood when wiping after urinating. Denies any fevers.

## 2024-02-23 NOTE — ED PROVIDER NOTES
Emergency Department Provider Note       PCP: Not, On File (Inactive)   Age: 63 y.o.   Sex: female     DISPOSITION Decision To Discharge 01/17/2024 02:13:15 AM       ICD-10-CM    1. Urinary tract infection with hematuria, site unspecified  N39.0     R31.9           Medical Decision Making     Complexity of Problems Addressed:  1 or more acute illnesses that pose a threat to life or bodily function.     Data Reviewed and Analyzed:  I independently ordered and reviewed each unique test.  I reviewed external records: ED visit note from an outside group.           Discussion of management or test interpretation.  See ED course below      Risk of Complications and/or Morbidity of Patient Management:  Prescription drug management performed.  Patient was discharged risks and benefits of hospitalization were considered.  Shared medical decision making was utilized in creating the patients health plan today.    ED Course as of 01/17/24 0219 Wed Jan 17, 2024 0218 60-year-old female with no known medical history presents for dysuria.  She has no abdominal or flank tenderness.  No tachycardia or fever.  Urinalysis shows greater than 100 white blood cells.  Discharged with prescription for Keflex. [CJ]      ED Course User Index  [CJ] Abbie Sofia, APRN - CNP       History       63-year-old female with no known past medical history presents for dysuria worsening over the course of 1 day.  Denies fever or chills.  Denies abdominal pain or flank pain.      The history is provided by the patient.        Review of Systems   Genitourinary:  Positive for dysuria.   All other systems reviewed and are negative.      Physical Exam     Vitals signs and nursing note reviewed:  Vitals:    01/17/24 0139 01/17/24 0142 01/17/24 0217   BP: (!) 167/99     Pulse: 76     Resp: 20     Temp:  97.7 °F (36.5 °C)    TempSrc:  Oral    SpO2: 99%     Weight:   59 kg (130 lb)   Height:   1.448 m (4' 9\")      Physical Exam  Vitals and nursing note 
irregular & excessive "don't know"